# Patient Record
Sex: MALE | Race: WHITE | Employment: UNEMPLOYED | URBAN - METROPOLITAN AREA
[De-identification: names, ages, dates, MRNs, and addresses within clinical notes are randomized per-mention and may not be internally consistent; named-entity substitution may affect disease eponyms.]

---

## 2017-02-13 ENCOUNTER — HOSPITAL ENCOUNTER (EMERGENCY)
Age: 52
Discharge: HOME | End: 2017-02-13
Attending: EMERGENCY MEDICINE
Payer: MEDICARE

## 2017-02-13 ENCOUNTER — APPOINTMENT (OUTPATIENT)
Dept: CT IMAGING | Age: 52
End: 2017-02-13
Payer: MEDICARE

## 2017-02-13 VITALS
DIASTOLIC BLOOD PRESSURE: 99 MMHG | HEART RATE: 98 BPM | SYSTOLIC BLOOD PRESSURE: 150 MMHG | OXYGEN SATURATION: 97 % | RESPIRATION RATE: 16 BRPM | TEMPERATURE: 97.5 F

## 2017-02-13 DIAGNOSIS — M54.50 CHRONIC MIDLINE LOW BACK PAIN WITHOUT SCIATICA: Primary | ICD-10-CM

## 2017-02-13 DIAGNOSIS — G89.29 CHRONIC MIDLINE LOW BACK PAIN WITHOUT SCIATICA: Primary | ICD-10-CM

## 2017-02-13 DIAGNOSIS — E11.65 TYPE 2 DIABETES MELLITUS WITH HYPERGLYCEMIA, UNSPECIFIED LONG TERM INSULIN USE STATUS: ICD-10-CM

## 2017-02-13 LAB
-: NORMAL
ABSOLUTE EOS #: 0.1 K/UL (ref 0–0.4)
ABSOLUTE LYMPH #: 1.9 K/UL (ref 1–4.8)
ABSOLUTE MONO #: 0.5 K/UL (ref 0.1–1.2)
AMORPHOUS: NORMAL
ANION GAP SERPL CALCULATED.3IONS-SCNC: 15 MMOL/L (ref 9–17)
BACTERIA: NORMAL
BASOPHILS # BLD: 1 % (ref 0–2)
BASOPHILS ABSOLUTE: 0 K/UL (ref 0–0.2)
BILIRUBIN URINE: NEGATIVE
BUN BLDV-MCNC: 12 MG/DL (ref 6–20)
BUN/CREAT BLD: ABNORMAL (ref 9–20)
CALCIUM SERPL-MCNC: 9.7 MG/DL (ref 8.6–10.4)
CASTS UA: NORMAL /LPF (ref 0–8)
CHLORIDE BLD-SCNC: 90 MMOL/L (ref 98–107)
CO2: 29 MMOL/L (ref 20–31)
COLOR: YELLOW
COMMENT UA: ABNORMAL
CREAT SERPL-MCNC: 0.85 MG/DL (ref 0.7–1.2)
CRYSTALS, UA: NORMAL /HPF
DIFFERENTIAL TYPE: NORMAL
EOSINOPHILS RELATIVE PERCENT: 1 % (ref 1–4)
EPITHELIAL CELLS UA: NORMAL /HPF (ref 0–5)
GFR AFRICAN AMERICAN: >60 ML/MIN
GFR NON-AFRICAN AMERICAN: >60 ML/MIN
GFR SERPL CREATININE-BSD FRML MDRD: ABNORMAL ML/MIN/{1.73_M2}
GFR SERPL CREATININE-BSD FRML MDRD: ABNORMAL ML/MIN/{1.73_M2}
GLUCOSE BLD-MCNC: 431 MG/DL (ref 70–99)
GLUCOSE BLD-MCNC: 446 MG/DL (ref 75–110)
GLUCOSE URINE: ABNORMAL
HCT VFR BLD CALC: 45.1 % (ref 41–53)
HEMOGLOBIN: 16.2 G/DL (ref 13.5–17.5)
KETONES, URINE: ABNORMAL
LEUKOCYTE ESTERASE, URINE: NEGATIVE
LYMPHOCYTES # BLD: 26 % (ref 24–44)
MCH RBC QN AUTO: 29.8 PG (ref 26–34)
MCHC RBC AUTO-ENTMCNC: 36 G/DL (ref 31–37)
MCV RBC AUTO: 82.6 FL (ref 80–100)
MONOCYTES # BLD: 7 % (ref 2–11)
MUCUS: NORMAL
NITRITE, URINE: NEGATIVE
OTHER OBSERVATIONS UA: NORMAL
PDW BLD-RTO: 14.6 % (ref 12.5–15.4)
PH UA: 6 (ref 5–8)
PLATELET # BLD: 222 K/UL (ref 140–450)
PLATELET ESTIMATE: NORMAL
PMV BLD AUTO: 8.4 FL (ref 6–12)
POTASSIUM SERPL-SCNC: 4.1 MMOL/L (ref 3.7–5.3)
PROTEIN UA: ABNORMAL
RBC # BLD: 5.46 M/UL (ref 4.5–5.9)
RBC # BLD: NORMAL 10*6/UL
RBC UA: NORMAL /HPF (ref 0–4)
RENAL EPITHELIAL, UA: NORMAL /HPF
SEG NEUTROPHILS: 65 % (ref 36–66)
SEGMENTED NEUTROPHILS ABSOLUTE COUNT: 4.8 K/UL (ref 1.8–7.7)
SODIUM BLD-SCNC: 134 MMOL/L (ref 135–144)
SPECIFIC GRAVITY UA: 1.04 (ref 1–1.03)
TRICHOMONAS: NORMAL
TURBIDITY: CLEAR
URINE HGB: NEGATIVE
UROBILINOGEN, URINE: NORMAL
WBC # BLD: 7.4 K/UL (ref 3.5–11)
WBC # BLD: NORMAL 10*3/UL
WBC UA: NORMAL /HPF (ref 0–5)
YEAST: NORMAL

## 2017-02-13 PROCEDURE — 72131 CT LUMBAR SPINE W/O DYE: CPT

## 2017-02-13 PROCEDURE — 85025 COMPLETE CBC W/AUTO DIFF WBC: CPT

## 2017-02-13 PROCEDURE — 81001 URINALYSIS AUTO W/SCOPE: CPT

## 2017-02-13 PROCEDURE — 82947 ASSAY GLUCOSE BLOOD QUANT: CPT

## 2017-02-13 PROCEDURE — 80048 BASIC METABOLIC PNL TOTAL CA: CPT

## 2017-02-13 PROCEDURE — 6370000000 HC RX 637 (ALT 250 FOR IP): Performed by: PHYSICIAN ASSISTANT

## 2017-02-13 PROCEDURE — 99284 EMERGENCY DEPT VISIT MOD MDM: CPT

## 2017-02-13 PROCEDURE — 72128 CT CHEST SPINE W/O DYE: CPT

## 2017-02-13 PROCEDURE — 96372 THER/PROPH/DIAG INJ SC/IM: CPT

## 2017-02-13 RX ORDER — IBUPROFEN 800 MG/1
800 TABLET ORAL EVERY 8 HOURS PRN
Qty: 21 TABLET | Refills: 0 | Status: SHIPPED | OUTPATIENT
Start: 2017-02-13 | End: 2017-09-20 | Stop reason: ALTCHOICE

## 2017-02-13 RX ORDER — HYDROCODONE BITARTRATE AND ACETAMINOPHEN 5; 325 MG/1; MG/1
2 TABLET ORAL ONCE
Status: COMPLETED | OUTPATIENT
Start: 2017-02-13 | End: 2017-02-13

## 2017-02-13 RX ADMIN — HYDROCODONE BITARTRATE AND ACETAMINOPHEN 2 TABLET: 5; 325 TABLET ORAL at 10:44

## 2017-02-13 RX ADMIN — INSULIN LISPRO 10 UNITS: 100 INJECTION, SOLUTION INTRAVENOUS; SUBCUTANEOUS at 11:44

## 2017-02-13 ASSESSMENT — ENCOUNTER SYMPTOMS
RESPIRATORY NEGATIVE: 1
GASTROINTESTINAL NEGATIVE: 1
EYES NEGATIVE: 1
ALLERGIC/IMMUNOLOGIC NEGATIVE: 1
BACK PAIN: 1

## 2017-02-13 ASSESSMENT — PAIN SCALES - GENERAL
PAINLEVEL_OUTOF10: 8
PAINLEVEL_OUTOF10: 8

## 2017-04-05 RX ORDER — PEN NEEDLE, DIABETIC 31 G X1/4"
NEEDLE, DISPOSABLE MISCELLANEOUS
Qty: 100 EACH | Refills: 11 | Status: ON HOLD | OUTPATIENT
Start: 2017-04-05 | End: 2018-01-01 | Stop reason: HOSPADM

## 2017-08-18 ENCOUNTER — OFFICE VISIT (OUTPATIENT)
Dept: FAMILY MEDICINE CLINIC | Age: 52
End: 2017-08-18
Payer: MEDICARE

## 2017-08-18 VITALS
HEART RATE: 73 BPM | OXYGEN SATURATION: 99 % | SYSTOLIC BLOOD PRESSURE: 136 MMHG | DIASTOLIC BLOOD PRESSURE: 100 MMHG | BODY MASS INDEX: 24.37 KG/M2 | WEIGHT: 165 LBS | TEMPERATURE: 97 F

## 2017-08-18 DIAGNOSIS — B35.1 ONYCHOMYCOSIS: ICD-10-CM

## 2017-08-18 DIAGNOSIS — E11.42 DIABETIC POLYNEUROPATHY ASSOCIATED WITH TYPE 2 DIABETES MELLITUS (HCC): ICD-10-CM

## 2017-08-18 LAB
GLUCOSE BLD-MCNC: 514 MG/DL
HBA1C MFR BLD: 14 %

## 2017-08-18 PROCEDURE — 82962 GLUCOSE BLOOD TEST: CPT | Performed by: INTERNAL MEDICINE

## 2017-08-18 PROCEDURE — 83036 HEMOGLOBIN GLYCOSYLATED A1C: CPT | Performed by: INTERNAL MEDICINE

## 2017-08-18 PROCEDURE — 99214 OFFICE O/P EST MOD 30 MIN: CPT | Performed by: INTERNAL MEDICINE

## 2017-08-18 RX ORDER — GABAPENTIN 100 MG/1
100 CAPSULE ORAL 3 TIMES DAILY
Qty: 90 CAPSULE | Refills: 1 | Status: ON HOLD | OUTPATIENT
Start: 2017-08-18 | End: 2018-01-01 | Stop reason: HOSPADM

## 2017-09-20 ENCOUNTER — OFFICE VISIT (OUTPATIENT)
Dept: FAMILY MEDICINE CLINIC | Age: 52
End: 2017-09-20
Payer: MEDICARE

## 2017-09-20 ENCOUNTER — HOSPITAL ENCOUNTER (OUTPATIENT)
Age: 52
Setting detail: SPECIMEN
Discharge: HOME | End: 2017-09-20
Payer: MEDICARE

## 2017-09-20 VITALS
OXYGEN SATURATION: 97 % | BODY MASS INDEX: 24.29 KG/M2 | TEMPERATURE: 97 F | WEIGHT: 164 LBS | HEART RATE: 86 BPM | HEIGHT: 69 IN | DIASTOLIC BLOOD PRESSURE: 86 MMHG | SYSTOLIC BLOOD PRESSURE: 138 MMHG

## 2017-09-20 DIAGNOSIS — K21.9 GASTROESOPHAGEAL REFLUX DISEASE WITHOUT ESOPHAGITIS: ICD-10-CM

## 2017-09-20 DIAGNOSIS — Z11.4 SCREENING FOR HIV WITHOUT PRESENCE OF RISK FACTORS: ICD-10-CM

## 2017-09-20 DIAGNOSIS — Z79.4 TYPE 2 DIABETES MELLITUS WITH DIABETIC POLYNEUROPATHY, WITH LONG-TERM CURRENT USE OF INSULIN (HCC): Primary | ICD-10-CM

## 2017-09-20 DIAGNOSIS — I10 ESSENTIAL HYPERTENSION: ICD-10-CM

## 2017-09-20 DIAGNOSIS — E11.42 TYPE 2 DIABETES MELLITUS WITH DIABETIC POLYNEUROPATHY, WITH LONG-TERM CURRENT USE OF INSULIN (HCC): Primary | ICD-10-CM

## 2017-09-20 DIAGNOSIS — F12.90 MARIJUANA USE, CONTINUOUS: ICD-10-CM

## 2017-09-20 DIAGNOSIS — L40.9 PSORIASIS: ICD-10-CM

## 2017-09-20 DIAGNOSIS — E78.1 HYPERTRIGLYCERIDEMIA: ICD-10-CM

## 2017-09-20 DIAGNOSIS — Z11.59 NEED FOR HEPATITIS C SCREENING TEST: ICD-10-CM

## 2017-09-20 DIAGNOSIS — E03.9 ACQUIRED HYPOTHYROIDISM: ICD-10-CM

## 2017-09-20 DIAGNOSIS — Z00.00 HEALTH CARE MAINTENANCE: ICD-10-CM

## 2017-09-20 DIAGNOSIS — E11.42 TYPE 2 DIABETES MELLITUS WITH DIABETIC POLYNEUROPATHY, WITH LONG-TERM CURRENT USE OF INSULIN (HCC): ICD-10-CM

## 2017-09-20 DIAGNOSIS — Z79.4 TYPE 2 DIABETES MELLITUS WITH DIABETIC POLYNEUROPATHY, WITH LONG-TERM CURRENT USE OF INSULIN (HCC): ICD-10-CM

## 2017-09-20 LAB
ALBUMIN SERPL-MCNC: 4.3 G/DL (ref 3.5–5.2)
ALBUMIN/GLOBULIN RATIO: 1.4 (ref 1–2.5)
ALP BLD-CCNC: 60 U/L (ref 40–129)
ALT SERPL-CCNC: 24 U/L (ref 5–41)
ANION GAP SERPL CALCULATED.3IONS-SCNC: 14 MMOL/L (ref 9–17)
AST SERPL-CCNC: 19 U/L
BILIRUB SERPL-MCNC: 0.67 MG/DL (ref 0.3–1.2)
BUN BLDV-MCNC: 9 MG/DL (ref 6–20)
BUN/CREAT BLD: ABNORMAL (ref 9–20)
CALCIUM SERPL-MCNC: 9.4 MG/DL (ref 8.6–10.4)
CHLORIDE BLD-SCNC: 92 MMOL/L (ref 98–107)
CHOLESTEROL, FASTING: 163 MG/DL
CHOLESTEROL/HDL RATIO: 4.1
CO2: 28 MMOL/L (ref 20–31)
CREAT SERPL-MCNC: 0.72 MG/DL (ref 0.7–1.2)
CREATININE URINE: 83.8 MG/DL (ref 39–259)
GFR AFRICAN AMERICAN: >60 ML/MIN
GFR NON-AFRICAN AMERICAN: >60 ML/MIN
GFR SERPL CREATININE-BSD FRML MDRD: ABNORMAL ML/MIN/{1.73_M2}
GFR SERPL CREATININE-BSD FRML MDRD: ABNORMAL ML/MIN/{1.73_M2}
GLUCOSE BLD-MCNC: 400 MG/DL (ref 70–99)
GLUCOSE BLD-MCNC: 424 MG/DL
HBA1C MFR BLD: 14 %
HCT VFR BLD CALC: 42.8 % (ref 41–53)
HDLC SERPL-MCNC: 40 MG/DL
HEMOGLOBIN: 14.7 G/DL (ref 13.5–17.5)
HEPATITIS C ANTIBODY: NONREACTIVE
HIV AG/AB: NONREACTIVE
LDL CHOLESTEROL: 94 MG/DL (ref 0–130)
MCH RBC QN AUTO: 30.5 PG (ref 26–34)
MCHC RBC AUTO-ENTMCNC: 34.3 G/DL (ref 31–37)
MCV RBC AUTO: 89 FL (ref 80–100)
MICROALBUMIN/CREAT 24H UR: 172 MG/L
MICROALBUMIN/CREAT UR-RTO: 205 MCG/MG CREAT
PDW BLD-RTO: 14.4 % (ref 12.5–15.4)
PLATELET # BLD: 190 K/UL (ref 140–450)
PMV BLD AUTO: 9.1 FL (ref 6–12)
POTASSIUM SERPL-SCNC: 4.1 MMOL/L (ref 3.7–5.3)
RBC # BLD: 4.81 M/UL (ref 4.5–5.9)
SODIUM BLD-SCNC: 134 MMOL/L (ref 135–144)
THYROXINE, FREE: 0.62 NG/DL (ref 0.93–1.7)
TOTAL PROTEIN: 7.3 G/DL (ref 6.4–8.3)
TRIGLYCERIDE, FASTING: 147 MG/DL
TSH SERPL DL<=0.05 MIU/L-ACNC: 58.4 MIU/L (ref 0.3–5)
VLDLC SERPL CALC-MCNC: ABNORMAL MG/DL (ref 1–30)
WBC # BLD: 5.9 K/UL (ref 3.5–11)

## 2017-09-20 PROCEDURE — 36415 COLL VENOUS BLD VENIPUNCTURE: CPT | Performed by: INTERNAL MEDICINE

## 2017-09-20 PROCEDURE — 82962 GLUCOSE BLOOD TEST: CPT | Performed by: INTERNAL MEDICINE

## 2017-09-20 PROCEDURE — 99214 OFFICE O/P EST MOD 30 MIN: CPT | Performed by: INTERNAL MEDICINE

## 2017-09-20 PROCEDURE — 83036 HEMOGLOBIN GLYCOSYLATED A1C: CPT | Performed by: INTERNAL MEDICINE

## 2017-09-20 RX ORDER — ATORVASTATIN CALCIUM 40 MG/1
40 TABLET, FILM COATED ORAL DAILY
Qty: 30 TABLET | Refills: 5 | Status: ON HOLD | OUTPATIENT
Start: 2017-09-20 | End: 2018-01-01 | Stop reason: HOSPADM

## 2017-09-20 RX ORDER — LEVOTHYROXINE SODIUM 137 UG/1
137 TABLET ORAL DAILY
Qty: 30 TABLET | Refills: 5 | Status: ON HOLD | OUTPATIENT
Start: 2017-09-20 | End: 2018-01-01 | Stop reason: HOSPADM

## 2017-09-20 RX ORDER — HYDROCHLOROTHIAZIDE 25 MG/1
TABLET ORAL
Qty: 30 TABLET | Refills: 5 | Status: ON HOLD | OUTPATIENT
Start: 2017-09-20 | End: 2018-01-01 | Stop reason: HOSPADM

## 2017-09-20 RX ORDER — PIOGLITAZONEHYDROCHLORIDE 45 MG/1
45 TABLET ORAL DAILY
Qty: 30 TABLET | Refills: 3 | Status: ON HOLD | OUTPATIENT
Start: 2017-09-20 | End: 2018-01-01 | Stop reason: HOSPADM

## 2017-10-17 ENCOUNTER — TELEPHONE (OUTPATIENT)
Dept: FAMILY MEDICINE CLINIC | Age: 52
End: 2017-10-17

## 2018-01-01 ENCOUNTER — APPOINTMENT (OUTPATIENT)
Dept: CT IMAGING | Age: 53
DRG: 281 | End: 2018-01-01
Payer: MEDICARE

## 2018-01-01 ENCOUNTER — APPOINTMENT (OUTPATIENT)
Dept: NUCLEAR MEDICINE | Age: 53
DRG: 281 | End: 2018-01-01
Payer: MEDICARE

## 2018-01-01 ENCOUNTER — HOSPITAL ENCOUNTER (INPATIENT)
Age: 53
LOS: 3 days | Discharge: HOME | DRG: 281 | End: 2018-12-01
Attending: EMERGENCY MEDICINE | Admitting: INTERNAL MEDICINE
Payer: MEDICARE

## 2018-01-01 ENCOUNTER — HOSPITAL ENCOUNTER (EMERGENCY)
Age: 53
Discharge: HOME | End: 2018-12-21
Attending: EMERGENCY MEDICINE
Payer: MEDICARE

## 2018-01-01 ENCOUNTER — APPOINTMENT (OUTPATIENT)
Dept: GENERAL RADIOLOGY | Age: 53
DRG: 281 | End: 2018-01-01
Payer: MEDICARE

## 2018-01-01 ENCOUNTER — TELEPHONE (OUTPATIENT)
Dept: FAMILY MEDICINE CLINIC | Age: 53
End: 2018-01-01

## 2018-01-01 ENCOUNTER — HOSPITAL ENCOUNTER (INPATIENT)
Age: 53
LOS: 4 days | Discharge: NURSING, REHAB OR LONG-TERM CARE | DRG: 637 | End: 2018-12-20
Attending: EMERGENCY MEDICINE | Admitting: INTERNAL MEDICINE
Payer: MEDICARE

## 2018-01-01 ENCOUNTER — APPOINTMENT (OUTPATIENT)
Dept: ULTRASOUND IMAGING | Age: 53
DRG: 637 | End: 2018-01-01
Payer: MEDICARE

## 2018-01-01 VITALS
OXYGEN SATURATION: 97 % | HEART RATE: 71 BPM | WEIGHT: 186.9 LBS | BODY MASS INDEX: 28.33 KG/M2 | RESPIRATION RATE: 11 BRPM | HEIGHT: 68 IN | TEMPERATURE: 97.5 F | DIASTOLIC BLOOD PRESSURE: 97 MMHG | SYSTOLIC BLOOD PRESSURE: 152 MMHG

## 2018-01-01 VITALS
SYSTOLIC BLOOD PRESSURE: 156 MMHG | BODY MASS INDEX: 27.28 KG/M2 | TEMPERATURE: 98.1 F | RESPIRATION RATE: 15 BRPM | OXYGEN SATURATION: 96 % | DIASTOLIC BLOOD PRESSURE: 96 MMHG | HEIGHT: 68 IN | HEART RATE: 89 BPM | WEIGHT: 180 LBS

## 2018-01-01 VITALS
DIASTOLIC BLOOD PRESSURE: 92 MMHG | OXYGEN SATURATION: 97 % | WEIGHT: 193.34 LBS | SYSTOLIC BLOOD PRESSURE: 168 MMHG | BODY MASS INDEX: 29.3 KG/M2 | TEMPERATURE: 97.6 F | HEIGHT: 68 IN | HEART RATE: 94 BPM | RESPIRATION RATE: 18 BRPM

## 2018-01-01 DIAGNOSIS — R79.89 ELEVATED TSH: Primary | ICD-10-CM

## 2018-01-01 DIAGNOSIS — I87.2 VENOUS STASIS DERMATITIS OF BOTH LOWER EXTREMITIES: ICD-10-CM

## 2018-01-01 DIAGNOSIS — E11.65 UNCONTROLLED TYPE 2 DIABETES MELLITUS WITH HYPERGLYCEMIA (HCC): ICD-10-CM

## 2018-01-01 DIAGNOSIS — L97.409 DIABETIC ULCER OF MIDFOOT ASSOCIATED WITH TYPE 2 DIABETES MELLITUS, UNSPECIFIED LATERALITY, UNSPECIFIED ULCER STAGE (HCC): Primary | ICD-10-CM

## 2018-01-01 DIAGNOSIS — E03.8 HYPOTHYROIDISM DUE TO HASHIMOTO'S THYROIDITIS: ICD-10-CM

## 2018-01-01 DIAGNOSIS — E11.621 DIABETIC ULCER OF MIDFOOT ASSOCIATED WITH TYPE 2 DIABETES MELLITUS, UNSPECIFIED LATERALITY, UNSPECIFIED ULCER STAGE (HCC): Primary | ICD-10-CM

## 2018-01-01 DIAGNOSIS — R73.9 HYPERGLYCEMIA: ICD-10-CM

## 2018-01-01 DIAGNOSIS — S09.90XA INJURY OF HEAD, INITIAL ENCOUNTER: Primary | ICD-10-CM

## 2018-01-01 DIAGNOSIS — E87.6 HYPOKALEMIA: ICD-10-CM

## 2018-01-01 DIAGNOSIS — E06.3 HYPOTHYROIDISM DUE TO HASHIMOTO'S THYROIDITIS: ICD-10-CM

## 2018-01-01 DIAGNOSIS — E03.9 HYPOTHYROIDISM, UNSPECIFIED TYPE: ICD-10-CM

## 2018-01-01 DIAGNOSIS — I10 ESSENTIAL HYPERTENSION: ICD-10-CM

## 2018-01-01 LAB
-: ABNORMAL
-: ABNORMAL
-: NORMAL
ABSOLUTE EOS #: 0.03 K/UL (ref 0–0.44)
ABSOLUTE EOS #: 0.06 K/UL (ref 0–0.44)
ABSOLUTE EOS #: 0.07 K/UL (ref 0–0.44)
ABSOLUTE EOS #: <0.03 K/UL (ref 0–0.44)
ABSOLUTE IMMATURE GRANULOCYTE: 0.03 K/UL (ref 0–0.3)
ABSOLUTE IMMATURE GRANULOCYTE: 0.03 K/UL (ref 0–0.3)
ABSOLUTE IMMATURE GRANULOCYTE: 0.04 K/UL (ref 0–0.3)
ABSOLUTE IMMATURE GRANULOCYTE: 0.05 K/UL (ref 0–0.3)
ABSOLUTE IMMATURE GRANULOCYTE: 0.06 K/UL (ref 0–0.3)
ABSOLUTE IMMATURE GRANULOCYTE: 0.06 K/UL (ref 0–0.3)
ABSOLUTE LYMPH #: 0.91 K/UL (ref 1.1–3.7)
ABSOLUTE LYMPH #: 1.2 K/UL (ref 1.1–3.7)
ABSOLUTE LYMPH #: 1.28 K/UL (ref 1.1–3.7)
ABSOLUTE LYMPH #: 1.66 K/UL (ref 1.1–3.7)
ABSOLUTE LYMPH #: 1.75 K/UL (ref 1.1–3.7)
ABSOLUTE LYMPH #: 1.88 K/UL (ref 1.1–3.7)
ABSOLUTE MONO #: 0.51 K/UL (ref 0.1–1.2)
ABSOLUTE MONO #: 0.51 K/UL (ref 0.1–1.2)
ABSOLUTE MONO #: 0.53 K/UL (ref 0.1–1.2)
ABSOLUTE MONO #: 0.55 K/UL (ref 0.1–1.2)
ABSOLUTE MONO #: 0.63 K/UL (ref 0.1–1.2)
ABSOLUTE MONO #: 0.71 K/UL (ref 0.1–1.2)
ABSOLUTE RETIC #: 0.09 M/UL (ref 0.03–0.08)
ALBUMIN SERPL-MCNC: 3.2 G/DL (ref 3.5–5.2)
ALBUMIN SERPL-MCNC: 3.4 G/DL (ref 3.5–5.2)
ALBUMIN SERPL-MCNC: 3.8 G/DL (ref 3.5–5.2)
ALBUMIN/GLOBULIN RATIO: 1.1 (ref 1–2.5)
ALBUMIN/GLOBULIN RATIO: 1.4 (ref 1–2.5)
ALBUMIN/GLOBULIN RATIO: 1.5 (ref 1–2.5)
ALLEN TEST: ABNORMAL
ALLEN TEST: ABNORMAL
ALP BLD-CCNC: 175 U/L (ref 40–129)
ALP BLD-CCNC: 208 U/L (ref 40–129)
ALP BLD-CCNC: 367 U/L (ref 40–129)
ALT SERPL-CCNC: 51 U/L (ref 5–41)
ALT SERPL-CCNC: 77 U/L (ref 5–41)
ALT SERPL-CCNC: 90 U/L (ref 5–41)
AMORPHOUS: ABNORMAL
AMORPHOUS: ABNORMAL
AMORPHOUS: NORMAL
ANION GAP SERPL CALCULATED.3IONS-SCNC: 10 MMOL/L (ref 9–17)
ANION GAP SERPL CALCULATED.3IONS-SCNC: 11 MMOL/L (ref 9–17)
ANION GAP SERPL CALCULATED.3IONS-SCNC: 11 MMOL/L (ref 9–17)
ANION GAP SERPL CALCULATED.3IONS-SCNC: 12 MMOL/L (ref 9–17)
ANION GAP SERPL CALCULATED.3IONS-SCNC: 13 MMOL/L (ref 9–17)
ANION GAP SERPL CALCULATED.3IONS-SCNC: 15 MMOL/L (ref 9–17)
ANION GAP SERPL CALCULATED.3IONS-SCNC: 9 MMOL/L (ref 9–17)
ANION GAP SERPL CALCULATED.3IONS-SCNC: 9 MMOL/L (ref 9–17)
ANION GAP: 10 MMOL/L (ref 7–16)
ANION GAP: 8 MMOL/L (ref 7–16)
AST SERPL-CCNC: 19 U/L
AST SERPL-CCNC: 34 U/L
AST SERPL-CCNC: 38 U/L
BACTERIA: ABNORMAL
BACTERIA: ABNORMAL
BACTERIA: NORMAL
BASOPHILS # BLD: 0 % (ref 0–2)
BASOPHILS # BLD: 0 % (ref 0–2)
BASOPHILS # BLD: 1 % (ref 0–2)
BASOPHILS ABSOLUTE: 0.03 K/UL (ref 0–0.2)
BASOPHILS ABSOLUTE: 0.04 K/UL (ref 0–0.2)
BASOPHILS ABSOLUTE: 0.04 K/UL (ref 0–0.2)
BASOPHILS ABSOLUTE: 0.05 K/UL (ref 0–0.2)
BASOPHILS ABSOLUTE: 0.06 K/UL (ref 0–0.2)
BASOPHILS ABSOLUTE: <0.03 K/UL (ref 0–0.2)
BETA-HYDROXYBUTYRATE: 0.57 MMOL/L (ref 0.02–0.27)
BILIRUB SERPL-MCNC: 0.53 MG/DL (ref 0.3–1.2)
BILIRUB SERPL-MCNC: 0.56 MG/DL (ref 0.3–1.2)
BILIRUB SERPL-MCNC: 0.97 MG/DL (ref 0.3–1.2)
BILIRUBIN DIRECT: 0.17 MG/DL
BILIRUBIN DIRECT: 0.18 MG/DL
BILIRUBIN URINE: NEGATIVE
BILIRUBIN, INDIRECT: 0.35 MG/DL (ref 0–1)
BILIRUBIN, INDIRECT: 0.39 MG/DL (ref 0–1)
BUN BLDV-MCNC: 10 MG/DL (ref 6–20)
BUN BLDV-MCNC: 11 MG/DL (ref 6–20)
BUN BLDV-MCNC: 12 MG/DL (ref 6–20)
BUN BLDV-MCNC: 12 MG/DL (ref 6–20)
BUN BLDV-MCNC: 13 MG/DL (ref 6–20)
BUN BLDV-MCNC: 7 MG/DL (ref 6–20)
BUN BLDV-MCNC: 8 MG/DL (ref 6–20)
BUN BLDV-MCNC: 9 MG/DL (ref 6–20)
BUN/CREAT BLD: ABNORMAL (ref 9–20)
C DIFF AG + TOXIN: NORMAL
CALCIUM SERPL-MCNC: 8 MG/DL (ref 8.6–10.4)
CALCIUM SERPL-MCNC: 8.1 MG/DL (ref 8.6–10.4)
CALCIUM SERPL-MCNC: 8.1 MG/DL (ref 8.6–10.4)
CALCIUM SERPL-MCNC: 8.3 MG/DL (ref 8.6–10.4)
CALCIUM SERPL-MCNC: 8.3 MG/DL (ref 8.6–10.4)
CALCIUM SERPL-MCNC: 8.4 MG/DL (ref 8.6–10.4)
CALCIUM SERPL-MCNC: 8.4 MG/DL (ref 8.6–10.4)
CALCIUM SERPL-MCNC: 8.5 MG/DL (ref 8.6–10.4)
CALCIUM SERPL-MCNC: 8.5 MG/DL (ref 8.6–10.4)
CALCIUM SERPL-MCNC: 8.6 MG/DL (ref 8.6–10.4)
CALCIUM SERPL-MCNC: 8.7 MG/DL (ref 8.6–10.4)
CALCIUM SERPL-MCNC: 9.2 MG/DL (ref 8.6–10.4)
CAMPYLOBACTER PCR: NORMAL
CASTS UA: ABNORMAL /LPF (ref 0–8)
CASTS UA: ABNORMAL /LPF (ref 0–8)
CASTS UA: NORMAL /LPF (ref 0–8)
CHLORIDE BLD-SCNC: 100 MMOL/L (ref 98–107)
CHLORIDE BLD-SCNC: 100 MMOL/L (ref 98–107)
CHLORIDE BLD-SCNC: 102 MMOL/L (ref 98–107)
CHLORIDE BLD-SCNC: 90 MMOL/L (ref 98–107)
CHLORIDE BLD-SCNC: 93 MMOL/L (ref 98–107)
CHLORIDE BLD-SCNC: 96 MMOL/L (ref 98–107)
CHLORIDE BLD-SCNC: 96 MMOL/L (ref 98–107)
CHLORIDE BLD-SCNC: 97 MMOL/L (ref 98–107)
CHLORIDE BLD-SCNC: 98 MMOL/L (ref 98–107)
CHLORIDE BLD-SCNC: 99 MMOL/L (ref 98–107)
CHP ED QC CHECK: NORMAL
CO2: 26 MMOL/L (ref 20–31)
CO2: 27 MMOL/L (ref 20–31)
CO2: 28 MMOL/L (ref 20–31)
CO2: 29 MMOL/L (ref 20–31)
COLOR: ABNORMAL
COLOR: YELLOW
COLOR: YELLOW
COMMENT UA: ABNORMAL
CREAT SERPL-MCNC: 0.65 MG/DL (ref 0.7–1.2)
CREAT SERPL-MCNC: 0.65 MG/DL (ref 0.7–1.2)
CREAT SERPL-MCNC: 0.67 MG/DL (ref 0.7–1.2)
CREAT SERPL-MCNC: 0.68 MG/DL (ref 0.7–1.2)
CREAT SERPL-MCNC: 0.69 MG/DL (ref 0.7–1.2)
CREAT SERPL-MCNC: 0.72 MG/DL (ref 0.7–1.2)
CREAT SERPL-MCNC: 0.73 MG/DL (ref 0.7–1.2)
CREAT SERPL-MCNC: 0.74 MG/DL (ref 0.7–1.2)
CREAT SERPL-MCNC: 0.75 MG/DL (ref 0.7–1.2)
CREAT SERPL-MCNC: 0.76 MG/DL (ref 0.7–1.2)
CREAT SERPL-MCNC: 0.76 MG/DL (ref 0.7–1.2)
CREAT SERPL-MCNC: 0.77 MG/DL (ref 0.7–1.2)
CREAT SERPL-MCNC: 0.78 MG/DL (ref 0.7–1.2)
CREAT SERPL-MCNC: 0.82 MG/DL (ref 0.7–1.2)
CREATININE URINE: 56.6 MG/DL (ref 39–259)
CRYSTALS, UA: ABNORMAL /HPF
CRYSTALS, UA: ABNORMAL /HPF
CRYSTALS, UA: NORMAL /HPF
CULTURE: ABNORMAL
CULTURE: ABNORMAL
CULTURE: NORMAL
DIFFERENTIAL TYPE: ABNORMAL
EKG ATRIAL RATE: 101 BPM
EKG ATRIAL RATE: 68 BPM
EKG ATRIAL RATE: 71 BPM
EKG ATRIAL RATE: 78 BPM
EKG ATRIAL RATE: 80 BPM
EKG ATRIAL RATE: 83 BPM
EKG ATRIAL RATE: 88 BPM
EKG ATRIAL RATE: 89 BPM
EKG ATRIAL RATE: 94 BPM
EKG P AXIS: 47 DEGREES
EKG P AXIS: 47 DEGREES
EKG P AXIS: 48 DEGREES
EKG P AXIS: 53 DEGREES
EKG P AXIS: 55 DEGREES
EKG P AXIS: 55 DEGREES
EKG P AXIS: 56 DEGREES
EKG P AXIS: 65 DEGREES
EKG P AXIS: 73 DEGREES
EKG P-R INTERVAL: 156 MS
EKG P-R INTERVAL: 156 MS
EKG P-R INTERVAL: 168 MS
EKG P-R INTERVAL: 170 MS
EKG P-R INTERVAL: 174 MS
EKG P-R INTERVAL: 182 MS
EKG P-R INTERVAL: 198 MS
EKG Q-T INTERVAL: 346 MS
EKG Q-T INTERVAL: 370 MS
EKG Q-T INTERVAL: 374 MS
EKG Q-T INTERVAL: 376 MS
EKG Q-T INTERVAL: 378 MS
EKG Q-T INTERVAL: 382 MS
EKG Q-T INTERVAL: 402 MS
EKG Q-T INTERVAL: 408 MS
EKG Q-T INTERVAL: 414 MS
EKG QRS DURATION: 68 MS
EKG QRS DURATION: 72 MS
EKG QRS DURATION: 72 MS
EKG QRS DURATION: 76 MS
EKG QRS DURATION: 80 MS
EKG QRS DURATION: 82 MS
EKG QRS DURATION: 82 MS
EKG QRS DURATION: 84 MS
EKG QRS DURATION: 86 MS
EKG QTC CALCULATION (BAZETT): 402 MS
EKG QTC CALCULATION (BAZETT): 433 MS
EKG QTC CALCULATION (BAZETT): 440 MS
EKG QTC CALCULATION (BAZETT): 448 MS
EKG QTC CALCULATION (BAZETT): 454 MS
EKG QTC CALCULATION (BAZETT): 459 MS
EKG QTC CALCULATION (BAZETT): 467 MS
EKG QTC CALCULATION (BAZETT): 471 MS
EKG QTC CALCULATION (BAZETT): 472 MS
EKG R AXIS: 16 DEGREES
EKG R AXIS: 27 DEGREES
EKG R AXIS: 38 DEGREES
EKG R AXIS: 39 DEGREES
EKG R AXIS: 44 DEGREES
EKG R AXIS: 51 DEGREES
EKG R AXIS: 61 DEGREES
EKG R AXIS: 63 DEGREES
EKG R AXIS: 79 DEGREES
EKG T AXIS: 28 DEGREES
EKG T AXIS: 31 DEGREES
EKG T AXIS: 37 DEGREES
EKG T AXIS: 38 DEGREES
EKG T AXIS: 40 DEGREES
EKG T AXIS: 40 DEGREES
EKG T AXIS: 44 DEGREES
EKG T AXIS: 55 DEGREES
EKG T AXIS: 61 DEGREES
EKG VENTRICULAR RATE: 101 BPM
EKG VENTRICULAR RATE: 68 BPM
EKG VENTRICULAR RATE: 71 BPM
EKG VENTRICULAR RATE: 78 BPM
EKG VENTRICULAR RATE: 80 BPM
EKG VENTRICULAR RATE: 83 BPM
EKG VENTRICULAR RATE: 88 BPM
EKG VENTRICULAR RATE: 89 BPM
EKG VENTRICULAR RATE: 94 BPM
EOSINOPHILS RELATIVE PERCENT: 0 % (ref 1–4)
EOSINOPHILS RELATIVE PERCENT: 0 % (ref 1–4)
EOSINOPHILS RELATIVE PERCENT: 1 % (ref 1–4)
EPITHELIAL CELLS UA: ABNORMAL /HPF (ref 0–5)
EPITHELIAL CELLS UA: ABNORMAL /HPF (ref 0–5)
EPITHELIAL CELLS UA: NORMAL /HPF (ref 0–5)
ESTIMATED AVERAGE GLUCOSE: 358 MG/DL
ESTIMATED AVERAGE GLUCOSE: NORMAL MG/DL
FERRITIN: 399 UG/L (ref 30–400)
FIO2: ABNORMAL
FIO2: ABNORMAL
FOLATE: 14.1 NG/ML
GFR AFRICAN AMERICAN: >60 ML/MIN
GFR NON-AFRICAN AMERICAN: >60 ML/MIN
GFR SERPL CREATININE-BSD FRML MDRD: >60 ML/MIN
GFR SERPL CREATININE-BSD FRML MDRD: >60 ML/MIN
GFR SERPL CREATININE-BSD FRML MDRD: ABNORMAL ML/MIN/{1.73_M2}
GFR SERPL CREATININE-BSD FRML MDRD: NORMAL ML/MIN/{1.73_M2}
GFR SERPL CREATININE-BSD FRML MDRD: NORMAL ML/MIN/{1.73_M2}
GGT: 201 U/L (ref 8–61)
GLOBULIN: ABNORMAL G/DL (ref 1.5–3.8)
GLOBULIN: ABNORMAL G/DL (ref 1.5–3.8)
GLUCOSE BLD-MCNC: 106 MG/DL (ref 75–110)
GLUCOSE BLD-MCNC: 107 MG/DL (ref 75–110)
GLUCOSE BLD-MCNC: 115 MG/DL (ref 70–99)
GLUCOSE BLD-MCNC: 124 MG/DL (ref 75–110)
GLUCOSE BLD-MCNC: 127 MG/DL (ref 75–110)
GLUCOSE BLD-MCNC: 133 MG/DL (ref 75–110)
GLUCOSE BLD-MCNC: 143 MG/DL (ref 75–110)
GLUCOSE BLD-MCNC: 144 MG/DL (ref 75–110)
GLUCOSE BLD-MCNC: 145 MG/DL (ref 75–110)
GLUCOSE BLD-MCNC: 146 MG/DL (ref 75–110)
GLUCOSE BLD-MCNC: 147 MG/DL (ref 75–110)
GLUCOSE BLD-MCNC: 151 MG/DL (ref 75–110)
GLUCOSE BLD-MCNC: 153 MG/DL (ref 75–110)
GLUCOSE BLD-MCNC: 154 MG/DL (ref 75–110)
GLUCOSE BLD-MCNC: 155 MG/DL (ref 70–99)
GLUCOSE BLD-MCNC: 155 MG/DL (ref 75–110)
GLUCOSE BLD-MCNC: 171 MG/DL (ref 75–110)
GLUCOSE BLD-MCNC: 177 MG/DL (ref 75–110)
GLUCOSE BLD-MCNC: 181 MG/DL (ref 75–110)
GLUCOSE BLD-MCNC: 186 MG/DL (ref 70–99)
GLUCOSE BLD-MCNC: 189 MG/DL (ref 70–99)
GLUCOSE BLD-MCNC: 191 MG/DL (ref 75–110)
GLUCOSE BLD-MCNC: 191 MG/DL (ref 75–110)
GLUCOSE BLD-MCNC: 193 MG/DL (ref 75–110)
GLUCOSE BLD-MCNC: 195 MG/DL (ref 75–110)
GLUCOSE BLD-MCNC: 215 MG/DL (ref 75–110)
GLUCOSE BLD-MCNC: 216 MG/DL (ref 75–110)
GLUCOSE BLD-MCNC: 224 MG/DL (ref 75–110)
GLUCOSE BLD-MCNC: 224 MG/DL (ref 75–110)
GLUCOSE BLD-MCNC: 226 MG/DL (ref 70–99)
GLUCOSE BLD-MCNC: 228 MG/DL (ref 75–110)
GLUCOSE BLD-MCNC: 230 MG/DL (ref 75–110)
GLUCOSE BLD-MCNC: 231 MG/DL (ref 75–110)
GLUCOSE BLD-MCNC: 241 MG/DL (ref 75–110)
GLUCOSE BLD-MCNC: 252 MG/DL (ref 75–110)
GLUCOSE BLD-MCNC: 260 MG/DL (ref 75–110)
GLUCOSE BLD-MCNC: 265 MG/DL (ref 70–99)
GLUCOSE BLD-MCNC: 266 MG/DL (ref 75–110)
GLUCOSE BLD-MCNC: 270 MG/DL (ref 75–110)
GLUCOSE BLD-MCNC: 277 MG/DL (ref 75–110)
GLUCOSE BLD-MCNC: 297 MG/DL (ref 75–110)
GLUCOSE BLD-MCNC: 314 MG/DL (ref 70–99)
GLUCOSE BLD-MCNC: 324 MG/DL (ref 75–110)
GLUCOSE BLD-MCNC: 343 MG/DL (ref 70–99)
GLUCOSE BLD-MCNC: 350 MG/DL (ref 70–99)
GLUCOSE BLD-MCNC: 405 MG/DL
GLUCOSE BLD-MCNC: 405 MG/DL (ref 75–110)
GLUCOSE BLD-MCNC: 408 MG/DL (ref 75–110)
GLUCOSE BLD-MCNC: 409 MG/DL (ref 75–110)
GLUCOSE BLD-MCNC: 511 MG/DL (ref 74–100)
GLUCOSE BLD-MCNC: 587 MG/DL (ref 74–100)
GLUCOSE BLD-MCNC: 623 MG/DL (ref 70–99)
GLUCOSE BLD-MCNC: 65 MG/DL (ref 70–99)
GLUCOSE BLD-MCNC: 73 MG/DL (ref 75–110)
GLUCOSE BLD-MCNC: 74 MG/DL (ref 75–110)
GLUCOSE BLD-MCNC: 86 MG/DL (ref 70–99)
GLUCOSE BLD-MCNC: 94 MG/DL (ref 70–99)
GLUCOSE BLD-MCNC: 94 MG/DL (ref 70–99)
GLUCOSE URINE: ABNORMAL
HBA1C MFR BLD: 14.1 %
HBA1C MFR BLD: NORMAL % (ref 4–6)
HCO3 VENOUS: 31.9 MMOL/L (ref 22–29)
HCO3 VENOUS: 33.4 MMOL/L (ref 22–29)
HCT VFR BLD CALC: 32.1 % (ref 40.7–50.3)
HCT VFR BLD CALC: 33.2 % (ref 40.7–50.3)
HCT VFR BLD CALC: 34.9 % (ref 40.7–50.3)
HCT VFR BLD CALC: 34.9 % (ref 40.7–50.3)
HCT VFR BLD CALC: 35.4 % (ref 40.7–50.3)
HCT VFR BLD CALC: 36.7 % (ref 40.7–50.3)
HCT VFR BLD CALC: 36.8 % (ref 40.7–50.3)
HCT VFR BLD CALC: 37.7 % (ref 40.7–50.3)
HCT VFR BLD CALC: 39 % (ref 40.7–50.3)
HCT VFR BLD CALC: 42.2 % (ref 40.7–50.3)
HEMOGLOBIN: 11.1 G/DL (ref 13–17)
HEMOGLOBIN: 11.2 G/DL (ref 13–17)
HEMOGLOBIN: 11.8 G/DL (ref 13–17)
HEMOGLOBIN: 11.9 G/DL (ref 13–17)
HEMOGLOBIN: 12.6 G/DL (ref 13–17)
HEMOGLOBIN: 12.7 G/DL (ref 13–17)
HEMOGLOBIN: 12.8 G/DL (ref 13–17)
HEMOGLOBIN: 13.2 G/DL (ref 13–17)
HEMOGLOBIN: 14.3 G/DL (ref 13–17)
HEMOGLOBIN: 15.1 G/DL (ref 13–17)
IMMATURE GRANULOCYTES: 0 %
IMMATURE GRANULOCYTES: 1 %
IMMATURE RETIC FRACT: 10.9 % (ref 2.7–18.3)
IRON SATURATION: 19 % (ref 20–55)
IRON: 43 UG/DL (ref 59–158)
KETONES, URINE: ABNORMAL
KETONES, URINE: ABNORMAL
KETONES, URINE: NEGATIVE
LEUKOCYTE ESTERASE, URINE: NEGATIVE
LIPASE: 37 U/L (ref 13–60)
LV EF: 48 %
LV EF: 52 %
LVEF MODALITY: NORMAL
LVEF MODALITY: NORMAL
LYMPHOCYTES # BLD: 10 % (ref 24–43)
LYMPHOCYTES # BLD: 15 % (ref 24–43)
LYMPHOCYTES # BLD: 17 % (ref 24–43)
LYMPHOCYTES # BLD: 25 % (ref 24–43)
LYMPHOCYTES # BLD: 25 % (ref 24–43)
LYMPHOCYTES # BLD: 31 % (ref 24–43)
Lab: ABNORMAL
Lab: NORMAL
MAGNESIUM: 1.6 MG/DL (ref 1.6–2.6)
MAGNESIUM: 2.2 MG/DL (ref 1.6–2.6)
MAGNESIUM: 2.3 MG/DL (ref 1.6–2.6)
MAGNESIUM: 2.4 MG/DL (ref 1.6–2.6)
MCH RBC QN AUTO: 30.5 PG (ref 25.2–33.5)
MCH RBC QN AUTO: 30.6 PG (ref 25.2–33.5)
MCH RBC QN AUTO: 30.7 PG (ref 25.2–33.5)
MCH RBC QN AUTO: 31.1 PG (ref 25.2–33.5)
MCH RBC QN AUTO: 31.1 PG (ref 25.2–33.5)
MCH RBC QN AUTO: 31.2 PG (ref 25.2–33.5)
MCH RBC QN AUTO: 31.3 PG (ref 25.2–33.5)
MCH RBC QN AUTO: 31.3 PG (ref 25.2–33.5)
MCHC RBC AUTO-ENTMCNC: 33.7 G/DL (ref 28.4–34.8)
MCHC RBC AUTO-ENTMCNC: 33.8 G/DL (ref 28.4–34.8)
MCHC RBC AUTO-ENTMCNC: 34.1 G/DL (ref 28.4–34.8)
MCHC RBC AUTO-ENTMCNC: 34.3 G/DL (ref 28.4–34.8)
MCHC RBC AUTO-ENTMCNC: 34.6 G/DL (ref 28.4–34.8)
MCHC RBC AUTO-ENTMCNC: 34.8 G/DL (ref 28.4–34.8)
MCHC RBC AUTO-ENTMCNC: 35 G/DL (ref 28.4–34.8)
MCHC RBC AUTO-ENTMCNC: 35.8 G/DL (ref 28.4–34.8)
MCHC RBC AUTO-ENTMCNC: 35.9 G/DL (ref 28.4–34.8)
MCHC RBC AUTO-ENTMCNC: 36.7 G/DL (ref 28.4–34.8)
MCV RBC AUTO: 83.7 FL (ref 82.6–102.9)
MCV RBC AUTO: 85.3 FL (ref 82.6–102.9)
MCV RBC AUTO: 86.8 FL (ref 82.6–102.9)
MCV RBC AUTO: 89.1 FL (ref 82.6–102.9)
MCV RBC AUTO: 89.3 FL (ref 82.6–102.9)
MCV RBC AUTO: 89.5 FL (ref 82.6–102.9)
MCV RBC AUTO: 90.2 FL (ref 82.6–102.9)
MCV RBC AUTO: 90.4 FL (ref 82.6–102.9)
MCV RBC AUTO: 90.7 FL (ref 82.6–102.9)
MCV RBC AUTO: 91.8 FL (ref 82.6–102.9)
MODE: ABNORMAL
MODE: ABNORMAL
MONOCYTES # BLD: 6 % (ref 3–12)
MONOCYTES # BLD: 7 % (ref 3–12)
MONOCYTES # BLD: 8 % (ref 3–12)
MONOCYTES # BLD: 9 % (ref 3–12)
MUCUS: ABNORMAL
MUCUS: ABNORMAL
MUCUS: NORMAL
NEGATIVE BASE EXCESS, VEN: ABNORMAL (ref 0–2)
NEGATIVE BASE EXCESS, VEN: ABNORMAL (ref 0–2)
NITRITE, URINE: NEGATIVE
NRBC AUTOMATED: 0 PER 100 WBC
NRBC AUTOMATED: 0.3 PER 100 WBC
O2 DEVICE/FLOW/%: ABNORMAL
O2 DEVICE/FLOW/%: ABNORMAL
O2 SAT, VEN: 51 % (ref 60–85)
O2 SAT, VEN: 90 % (ref 60–85)
OTHER OBSERVATIONS UA: ABNORMAL
OTHER OBSERVATIONS UA: ABNORMAL
OTHER OBSERVATIONS UA: NORMAL
PARTIAL THROMBOPLASTIN TIME: 35.2 SEC (ref 20.5–30.5)
PARTIAL THROMBOPLASTIN TIME: 38.5 SEC (ref 20.5–30.5)
PARTIAL THROMBOPLASTIN TIME: 40.9 SEC (ref 20.5–30.5)
PARTIAL THROMBOPLASTIN TIME: 45.5 SEC (ref 20.5–30.5)
PARTIAL THROMBOPLASTIN TIME: 52.6 SEC (ref 20.5–30.5)
PARTIAL THROMBOPLASTIN TIME: 58.1 SEC (ref 20.5–30.5)
PARTIAL THROMBOPLASTIN TIME: 59.4 SEC (ref 20.5–30.5)
PARTIAL THROMBOPLASTIN TIME: 75.8 SEC (ref 20.5–30.5)
PARTIAL THROMBOPLASTIN TIME: >120 SEC (ref 20.5–30.5)
PATIENT TEMP: ABNORMAL
PATIENT TEMP: ABNORMAL
PCO2, VEN: 41.1 MM HG (ref 41–51)
PCO2, VEN: 51.3 MM HG (ref 41–51)
PDW BLD-RTO: 12.7 % (ref 11.8–14.4)
PDW BLD-RTO: 12.8 % (ref 11.8–14.4)
PDW BLD-RTO: 12.9 % (ref 11.8–14.4)
PDW BLD-RTO: 13 % (ref 11.8–14.4)
PDW BLD-RTO: 13.1 % (ref 11.8–14.4)
PDW BLD-RTO: 13.1 % (ref 11.8–14.4)
PDW BLD-RTO: 13.2 % (ref 11.8–14.4)
PDW BLD-RTO: 13.2 % (ref 11.8–14.4)
PDW BLD-RTO: 13.4 % (ref 11.8–14.4)
PDW BLD-RTO: 13.5 % (ref 11.8–14.4)
PH UA: 6.5 (ref 5–8)
PH UA: 7.5 (ref 5–8)
PH UA: 7.5 (ref 5–8)
PH VENOUS: 7.42 (ref 7.32–7.43)
PH VENOUS: 7.5 (ref 7.32–7.43)
PHOSPHORUS: 1.9 MG/DL (ref 2.5–4.5)
PHOSPHORUS: 2 MG/DL (ref 2.5–4.5)
PHOSPHORUS: 2.4 MG/DL (ref 2.5–4.5)
PHOSPHORUS: 3.1 MG/DL (ref 2.5–4.5)
PHOSPHORUS: 3.3 MG/DL (ref 2.5–4.5)
PLATELET # BLD: 169 K/UL (ref 138–453)
PLATELET # BLD: 179 K/UL (ref 138–453)
PLATELET # BLD: 187 K/UL (ref 138–453)
PLATELET # BLD: 188 K/UL (ref 138–453)
PLATELET # BLD: 190 K/UL (ref 138–453)
PLATELET # BLD: 203 K/UL (ref 138–453)
PLATELET # BLD: 207 K/UL (ref 138–453)
PLATELET # BLD: 209 K/UL (ref 138–453)
PLATELET # BLD: 211 K/UL (ref 138–453)
PLATELET # BLD: 228 K/UL (ref 138–453)
PLATELET ESTIMATE: ABNORMAL
PMV BLD AUTO: 10 FL (ref 8.1–13.5)
PMV BLD AUTO: 10 FL (ref 8.1–13.5)
PMV BLD AUTO: 10.1 FL (ref 8.1–13.5)
PMV BLD AUTO: 10.2 FL (ref 8.1–13.5)
PMV BLD AUTO: 10.2 FL (ref 8.1–13.5)
PMV BLD AUTO: 9.6 FL (ref 8.1–13.5)
PMV BLD AUTO: 9.7 FL (ref 8.1–13.5)
PMV BLD AUTO: 9.8 FL (ref 8.1–13.5)
PO2, VEN: 27.3 MM HG (ref 30–50)
PO2, VEN: 53.3 MM HG (ref 30–50)
POC CHLORIDE: 92 MMOL/L (ref 98–107)
POC CHLORIDE: 92 MMOL/L (ref 98–107)
POC CREATININE: 0.86 MG/DL (ref 0.51–1.19)
POC CREATININE: 0.87 MG/DL (ref 0.51–1.19)
POC HEMATOCRIT: 40 % (ref 41–53)
POC HEMATOCRIT: 44 % (ref 41–53)
POC HEMOGLOBIN: 13.7 G/DL (ref 13.5–17.5)
POC HEMOGLOBIN: 14.8 G/DL (ref 13.5–17.5)
POC IONIZED CALCIUM: 1.03 MMOL/L (ref 1.15–1.33)
POC IONIZED CALCIUM: 1.08 MMOL/L (ref 1.15–1.33)
POC LACTIC ACID: 2.88 MMOL/L (ref 0.56–1.39)
POC LACTIC ACID: 3.27 MMOL/L (ref 0.56–1.39)
POC PCO2 TEMP: ABNORMAL MM HG
POC PCO2 TEMP: ABNORMAL MM HG
POC PH TEMP: ABNORMAL
POC PH TEMP: ABNORMAL
POC PO2 TEMP: ABNORMAL MM HG
POC PO2 TEMP: ABNORMAL MM HG
POC POTASSIUM: 3.1 MMOL/L (ref 3.5–4.5)
POC POTASSIUM: 3.4 MMOL/L (ref 3.5–4.5)
POC SODIUM: 132 MMOL/L (ref 138–146)
POC SODIUM: 135 MMOL/L (ref 138–146)
POSITIVE BASE EXCESS, VEN: 7 (ref 0–3)
POSITIVE BASE EXCESS, VEN: 8 (ref 0–3)
POTASSIUM SERPL-SCNC: 2.6 MMOL/L (ref 3.7–5.3)
POTASSIUM SERPL-SCNC: 2.8 MMOL/L (ref 3.7–5.3)
POTASSIUM SERPL-SCNC: 3.1 MMOL/L (ref 3.7–5.3)
POTASSIUM SERPL-SCNC: 3.2 MMOL/L (ref 3.7–5.3)
POTASSIUM SERPL-SCNC: 3.2 MMOL/L (ref 3.7–5.3)
POTASSIUM SERPL-SCNC: 3.3 MMOL/L (ref 3.7–5.3)
POTASSIUM SERPL-SCNC: 3.4 MMOL/L (ref 3.7–5.3)
POTASSIUM SERPL-SCNC: 3.5 MMOL/L (ref 3.7–5.3)
POTASSIUM SERPL-SCNC: 3.5 MMOL/L (ref 3.7–5.3)
POTASSIUM SERPL-SCNC: 3.6 MMOL/L (ref 3.7–5.3)
POTASSIUM SERPL-SCNC: 3.7 MMOL/L (ref 3.7–5.3)
POTASSIUM SERPL-SCNC: 4 MMOL/L (ref 3.7–5.3)
POTASSIUM SERPL-SCNC: 4.1 MMOL/L (ref 3.7–5.3)
POTASSIUM SERPL-SCNC: 4.1 MMOL/L (ref 3.7–5.3)
PROTEIN UA: ABNORMAL
RBC # BLD: 3.56 M/UL (ref 4.21–5.77)
RBC # BLD: 3.66 M/UL (ref 4.21–5.77)
RBC # BLD: 3.8 M/UL (ref 4.21–5.77)
RBC # BLD: 3.86 M/UL (ref 4.21–5.77)
RBC # BLD: 4.08 M/UL (ref 4.21–5.77)
RBC # BLD: 4.11 M/UL (ref 4.21–5.77)
RBC # BLD: 4.12 M/UL (ref 4.21–5.77)
RBC # BLD: 4.22 M/UL (ref 4.21–5.77)
RBC # BLD: 4.66 M/UL (ref 4.21–5.77)
RBC # BLD: 4.95 M/UL (ref 4.21–5.77)
RBC # BLD: ABNORMAL 10*6/UL
RBC UA: ABNORMAL /HPF (ref 0–4)
RBC UA: ABNORMAL /HPF (ref 0–4)
RBC UA: NORMAL /HPF (ref 0–4)
REASON FOR REJECTION: NORMAL
RENAL EPITHELIAL, UA: ABNORMAL /HPF
RENAL EPITHELIAL, UA: ABNORMAL /HPF
RENAL EPITHELIAL, UA: NORMAL /HPF
RETIC %: 2.3 % (ref 0.5–1.9)
RETIC HEMOGLOBIN: 37.1 PG (ref 28.2–35.7)
SALMONELLA PCR: NORMAL
SAMPLE SITE: ABNORMAL
SAMPLE SITE: ABNORMAL
SEG NEUTROPHILS: 57 % (ref 36–65)
SEG NEUTROPHILS: 63 % (ref 36–65)
SEG NEUTROPHILS: 64 % (ref 36–65)
SEG NEUTROPHILS: 73 % (ref 36–65)
SEG NEUTROPHILS: 75 % (ref 36–65)
SEG NEUTROPHILS: 84 % (ref 36–65)
SEGMENTED NEUTROPHILS ABSOLUTE COUNT: 3.48 K/UL (ref 1.5–8.1)
SEGMENTED NEUTROPHILS ABSOLUTE COUNT: 4.21 K/UL (ref 1.5–8.1)
SEGMENTED NEUTROPHILS ABSOLUTE COUNT: 4.32 K/UL (ref 1.5–8.1)
SEGMENTED NEUTROPHILS ABSOLUTE COUNT: 5.58 K/UL (ref 1.5–8.1)
SEGMENTED NEUTROPHILS ABSOLUTE COUNT: 5.7 K/UL (ref 1.5–8.1)
SEGMENTED NEUTROPHILS ABSOLUTE COUNT: 7.23 K/UL (ref 1.5–8.1)
SHIGATOXIN GENE PCR: NORMAL
SHIGELLA SP PCR: NORMAL
SODIUM BLD-SCNC: 132 MMOL/L (ref 135–144)
SODIUM BLD-SCNC: 134 MMOL/L (ref 135–144)
SODIUM BLD-SCNC: 134 MMOL/L (ref 135–144)
SODIUM BLD-SCNC: 135 MMOL/L (ref 135–144)
SODIUM BLD-SCNC: 136 MMOL/L (ref 135–144)
SODIUM BLD-SCNC: 137 MMOL/L (ref 135–144)
SODIUM BLD-SCNC: 137 MMOL/L (ref 135–144)
SODIUM BLD-SCNC: 138 MMOL/L (ref 135–144)
SODIUM BLD-SCNC: 139 MMOL/L (ref 135–144)
SODIUM BLD-SCNC: 142 MMOL/L (ref 135–144)
SPECIFIC GRAVITY UA: 1.03 (ref 1–1.03)
SPECIFIC GRAVITY UA: 1.03 (ref 1–1.03)
SPECIFIC GRAVITY UA: 1.04 (ref 1–1.03)
SPECIMEN DESCRIPTION: ABNORMAL
SPECIMEN DESCRIPTION: NORMAL
SPECIMEN: NORMAL
STATUS: ABNORMAL
STATUS: NORMAL
THYROGLOBULIN AB: 1653 IU/ML (ref 0–40)
THYROGLOBULIN: <0.2 NG/ML (ref 0.3–49.7)
THYROID PEROXIDASE (TPO) AB: 355 IU/ML (ref 0–35)
THYROXINE, FREE: 0.15 NG/DL (ref 0.93–1.7)
THYROXINE, FREE: 0.43 NG/DL (ref 0.93–1.7)
TOTAL CO2, VENOUS: 33 MMOL/L (ref 23–30)
TOTAL CO2, VENOUS: 35 MMOL/L (ref 23–30)
TOTAL IRON BINDING CAPACITY: 227 UG/DL (ref 250–450)
TOTAL PROTEIN, URINE: 24 MG/DL
TOTAL PROTEIN: 5.5 G/DL (ref 6.4–8.3)
TOTAL PROTEIN: 6.3 G/DL (ref 6.4–8.3)
TOTAL PROTEIN: 6.5 G/DL (ref 6.4–8.3)
TRICHOMONAS: ABNORMAL
TRICHOMONAS: ABNORMAL
TRICHOMONAS: NORMAL
TROPONIN INTERP: ABNORMAL
TROPONIN T: 0.11 NG/ML
TROPONIN T: 0.11 NG/ML
TROPONIN T: 0.12 NG/ML
TROPONIN T: 0.13 NG/ML
TROPONIN T: 0.14 NG/ML
TROPONIN T: 0.15 NG/ML
TROPONIN T: 0.16 NG/ML
TSH RECEPTOR AB: <0.9 IU/L
TSH SERPL DL<=0.05 MIU/L-ACNC: 67.69 MIU/L (ref 0.3–5)
TSH SERPL DL<=0.05 MIU/L-ACNC: 78.15 MIU/L (ref 0.3–5)
TURBIDITY: CLEAR
UNSATURATED IRON BINDING CAPACITY: 184 UG/DL (ref 112–347)
URINE HGB: NEGATIVE
URINE TOTAL PROTEIN CREATININE RATIO: 0.42 (ref 0–0.2)
UROBILINOGEN, URINE: NORMAL
VITAMIN B-12: 841 PG/ML (ref 232–1245)
WBC # BLD: 10.7 K/UL (ref 3.5–11.3)
WBC # BLD: 6 K/UL (ref 3.5–11.3)
WBC # BLD: 6.6 K/UL (ref 3.5–11.3)
WBC # BLD: 6.7 K/UL (ref 3.5–11.3)
WBC # BLD: 6.9 K/UL (ref 3.5–11.3)
WBC # BLD: 7 K/UL (ref 3.5–11.3)
WBC # BLD: 7.5 K/UL (ref 3.5–11.3)
WBC # BLD: 7.8 K/UL (ref 3.5–11.3)
WBC # BLD: 8.3 K/UL (ref 3.5–11.3)
WBC # BLD: 8.7 K/UL (ref 3.5–11.3)
WBC # BLD: ABNORMAL 10*3/UL
WBC UA: ABNORMAL /HPF (ref 0–5)
WBC UA: ABNORMAL /HPF (ref 0–5)
WBC UA: NORMAL /HPF (ref 0–5)
YEAST: ABNORMAL
YEAST: ABNORMAL
YEAST: NORMAL
ZZ NTE CLEAN UP: ORDERED TEST: NORMAL
ZZ NTE WITH NAME CLEAN UP: SPECIMEN SOURCE: NORMAL

## 2018-01-01 PROCEDURE — 80076 HEPATIC FUNCTION PANEL: CPT

## 2018-01-01 PROCEDURE — 6370000000 HC RX 637 (ALT 250 FOR IP): Performed by: STUDENT IN AN ORGANIZED HEALTH CARE EDUCATION/TRAINING PROGRAM

## 2018-01-01 PROCEDURE — 96372 THER/PROPH/DIAG INJ SC/IM: CPT

## 2018-01-01 PROCEDURE — 99233 SBSQ HOSP IP/OBS HIGH 50: CPT | Performed by: INTERNAL MEDICINE

## 2018-01-01 PROCEDURE — G0378 HOSPITAL OBSERVATION PER HR: HCPCS

## 2018-01-01 PROCEDURE — 2500000003 HC RX 250 WO HCPCS: Performed by: STUDENT IN AN ORGANIZED HEALTH CARE EDUCATION/TRAINING PROGRAM

## 2018-01-01 PROCEDURE — 2580000003 HC RX 258: Performed by: STUDENT IN AN ORGANIZED HEALTH CARE EDUCATION/TRAINING PROGRAM

## 2018-01-01 PROCEDURE — 82947 ASSAY GLUCOSE BLOOD QUANT: CPT

## 2018-01-01 PROCEDURE — 84100 ASSAY OF PHOSPHORUS: CPT

## 2018-01-01 PROCEDURE — 80048 BASIC METABOLIC PNL TOTAL CA: CPT

## 2018-01-01 PROCEDURE — 96366 THER/PROPH/DIAG IV INF ADDON: CPT

## 2018-01-01 PROCEDURE — G8988 SELF CARE GOAL STATUS: HCPCS

## 2018-01-01 PROCEDURE — 82570 ASSAY OF URINE CREATININE: CPT

## 2018-01-01 PROCEDURE — 83036 HEMOGLOBIN GLYCOSYLATED A1C: CPT

## 2018-01-01 PROCEDURE — 96376 TX/PRO/DX INJ SAME DRUG ADON: CPT

## 2018-01-01 PROCEDURE — 71046 X-RAY EXAM CHEST 2 VIEWS: CPT

## 2018-01-01 PROCEDURE — G8979 MOBILITY GOAL STATUS: HCPCS

## 2018-01-01 PROCEDURE — 97535 SELF CARE MNGMENT TRAINING: CPT

## 2018-01-01 PROCEDURE — 36415 COLL VENOUS BLD VENIPUNCTURE: CPT

## 2018-01-01 PROCEDURE — 6370000000 HC RX 637 (ALT 250 FOR IP): Performed by: EMERGENCY MEDICINE

## 2018-01-01 PROCEDURE — 86800 THYROGLOBULIN ANTIBODY: CPT

## 2018-01-01 PROCEDURE — 87449 NOS EACH ORGANISM AG IA: CPT

## 2018-01-01 PROCEDURE — 87505 NFCT AGENT DETECTION GI: CPT

## 2018-01-01 PROCEDURE — 96361 HYDRATE IV INFUSION ADD-ON: CPT

## 2018-01-01 PROCEDURE — 6360000002 HC RX W HCPCS: Performed by: STUDENT IN AN ORGANIZED HEALTH CARE EDUCATION/TRAINING PROGRAM

## 2018-01-01 PROCEDURE — 97166 OT EVAL MOD COMPLEX 45 MIN: CPT | Performed by: OCCUPATIONAL THERAPIST

## 2018-01-01 PROCEDURE — 80053 COMPREHEN METABOLIC PANEL: CPT

## 2018-01-01 PROCEDURE — 2580000003 HC RX 258: Performed by: PHYSICIAN ASSISTANT

## 2018-01-01 PROCEDURE — 82010 KETONE BODYS QUAN: CPT

## 2018-01-01 PROCEDURE — 6370000000 HC RX 637 (ALT 250 FOR IP): Performed by: PHYSICIAN ASSISTANT

## 2018-01-01 PROCEDURE — 83540 ASSAY OF IRON: CPT

## 2018-01-01 PROCEDURE — 1200000000 HC SEMI PRIVATE

## 2018-01-01 PROCEDURE — 82728 ASSAY OF FERRITIN: CPT

## 2018-01-01 PROCEDURE — 81001 URINALYSIS AUTO W/SCOPE: CPT

## 2018-01-01 PROCEDURE — 2580000003 HC RX 258: Performed by: INTERNAL MEDICINE

## 2018-01-01 PROCEDURE — G8987 SELF CARE CURRENT STATUS: HCPCS | Performed by: OCCUPATIONAL THERAPIST

## 2018-01-01 PROCEDURE — 85730 THROMBOPLASTIN TIME PARTIAL: CPT

## 2018-01-01 PROCEDURE — 87102 FUNGUS ISOLATION CULTURE: CPT

## 2018-01-01 PROCEDURE — 2700000000 HC OXYGEN THERAPY PER DAY

## 2018-01-01 PROCEDURE — G0108 DIAB MANAGE TRN  PER INDIV: HCPCS

## 2018-01-01 PROCEDURE — 99283 EMERGENCY DEPT VISIT LOW MDM: CPT

## 2018-01-01 PROCEDURE — 82435 ASSAY OF BLOOD CHLORIDE: CPT

## 2018-01-01 PROCEDURE — 6370000000 HC RX 637 (ALT 250 FOR IP): Performed by: HOSPITALIST

## 2018-01-01 PROCEDURE — 2060000000 HC ICU INTERMEDIATE R&B

## 2018-01-01 PROCEDURE — 99212 OFFICE O/P EST SF 10 MIN: CPT

## 2018-01-01 PROCEDURE — 83520 IMMUNOASSAY QUANT NOS NONAB: CPT

## 2018-01-01 PROCEDURE — 83605 ASSAY OF LACTIC ACID: CPT

## 2018-01-01 PROCEDURE — 83550 IRON BINDING TEST: CPT

## 2018-01-01 PROCEDURE — 6370000000 HC RX 637 (ALT 250 FOR IP): Performed by: INTERNAL MEDICINE

## 2018-01-01 PROCEDURE — G8978 MOBILITY CURRENT STATUS: HCPCS

## 2018-01-01 PROCEDURE — 83690 ASSAY OF LIPASE: CPT

## 2018-01-01 PROCEDURE — 99232 SBSQ HOSP IP/OBS MODERATE 35: CPT | Performed by: INTERNAL MEDICINE

## 2018-01-01 PROCEDURE — 99285 EMERGENCY DEPT VISIT HI MDM: CPT

## 2018-01-01 PROCEDURE — 85027 COMPLETE CBC AUTOMATED: CPT

## 2018-01-01 PROCEDURE — 84484 ASSAY OF TROPONIN QUANT: CPT

## 2018-01-01 PROCEDURE — 97166 OT EVAL MOD COMPLEX 45 MIN: CPT

## 2018-01-01 PROCEDURE — 97116 GAIT TRAINING THERAPY: CPT

## 2018-01-01 PROCEDURE — 85025 COMPLETE CBC W/AUTO DIFF WBC: CPT

## 2018-01-01 PROCEDURE — 93005 ELECTROCARDIOGRAM TRACING: CPT

## 2018-01-01 PROCEDURE — 84439 ASSAY OF FREE THYROXINE: CPT

## 2018-01-01 PROCEDURE — 99239 HOSP IP/OBS DSCHRG MGMT >30: CPT | Performed by: INTERNAL MEDICINE

## 2018-01-01 PROCEDURE — 94760 N-INVAS EAR/PLS OXIMETRY 1: CPT

## 2018-01-01 PROCEDURE — 70450 CT HEAD/BRAIN W/O DYE: CPT

## 2018-01-01 PROCEDURE — 82565 ASSAY OF CREATININE: CPT

## 2018-01-01 PROCEDURE — 93306 TTE W/DOPPLER COMPLETE: CPT

## 2018-01-01 PROCEDURE — 86376 MICROSOMAL ANTIBODY EACH: CPT

## 2018-01-01 PROCEDURE — 83735 ASSAY OF MAGNESIUM: CPT

## 2018-01-01 PROCEDURE — 94762 N-INVAS EAR/PLS OXIMTRY CONT: CPT

## 2018-01-01 PROCEDURE — 99223 1ST HOSP IP/OBS HIGH 75: CPT | Performed by: INTERNAL MEDICINE

## 2018-01-01 PROCEDURE — 84443 ASSAY THYROID STIM HORMONE: CPT

## 2018-01-01 PROCEDURE — 82330 ASSAY OF CALCIUM: CPT

## 2018-01-01 PROCEDURE — 87106 FUNGI IDENTIFICATION YEAST: CPT

## 2018-01-01 PROCEDURE — 82977 ASSAY OF GGT: CPT

## 2018-01-01 PROCEDURE — 72125 CT NECK SPINE W/O DYE: CPT

## 2018-01-01 PROCEDURE — 82607 VITAMIN B-12: CPT

## 2018-01-01 PROCEDURE — 6360000002 HC RX W HCPCS: Performed by: INTERNAL MEDICINE

## 2018-01-01 PROCEDURE — 82746 ASSAY OF FOLIC ACID SERUM: CPT

## 2018-01-01 PROCEDURE — 96365 THER/PROPH/DIAG IV INF INIT: CPT

## 2018-01-01 PROCEDURE — 87324 CLOSTRIDIUM AG IA: CPT

## 2018-01-01 PROCEDURE — G8988 SELF CARE GOAL STATUS: HCPCS | Performed by: OCCUPATIONAL THERAPIST

## 2018-01-01 PROCEDURE — 84156 ASSAY OF PROTEIN URINE: CPT

## 2018-01-01 PROCEDURE — 97162 PT EVAL MOD COMPLEX 30 MIN: CPT

## 2018-01-01 PROCEDURE — 84432 ASSAY OF THYROGLOBULIN: CPT

## 2018-01-01 PROCEDURE — 84132 ASSAY OF SERUM POTASSIUM: CPT

## 2018-01-01 PROCEDURE — 76937 US GUIDE VASCULAR ACCESS: CPT

## 2018-01-01 PROCEDURE — 78452 HT MUSCLE IMAGE SPECT MULT: CPT

## 2018-01-01 PROCEDURE — 97535 SELF CARE MNGMENT TRAINING: CPT | Performed by: OCCUPATIONAL THERAPIST

## 2018-01-01 PROCEDURE — 96375 TX/PRO/DX INJ NEW DRUG ADDON: CPT

## 2018-01-01 PROCEDURE — 97530 THERAPEUTIC ACTIVITIES: CPT

## 2018-01-01 PROCEDURE — 76705 ECHO EXAM OF ABDOMEN: CPT

## 2018-01-01 PROCEDURE — 87086 URINE CULTURE/COLONY COUNT: CPT

## 2018-01-01 PROCEDURE — 87040 BLOOD CULTURE FOR BACTERIA: CPT

## 2018-01-01 PROCEDURE — 93017 CV STRESS TEST TRACING ONLY: CPT | Performed by: NURSE PRACTITIONER

## 2018-01-01 PROCEDURE — 85045 AUTOMATED RETICULOCYTE COUNT: CPT

## 2018-01-01 PROCEDURE — G8987 SELF CARE CURRENT STATUS: HCPCS

## 2018-01-01 PROCEDURE — 51798 US URINE CAPACITY MEASURE: CPT

## 2018-01-01 PROCEDURE — A9500 TC99M SESTAMIBI: HCPCS | Performed by: INTERNAL MEDICINE

## 2018-01-01 PROCEDURE — 3430000000 HC RX DIAGNOSTIC RADIOPHARMACEUTICAL: Performed by: INTERNAL MEDICINE

## 2018-01-01 PROCEDURE — 85014 HEMATOCRIT: CPT

## 2018-01-01 PROCEDURE — 82803 BLOOD GASES ANY COMBINATION: CPT

## 2018-01-01 PROCEDURE — 96367 TX/PROPH/DG ADDL SEQ IV INF: CPT

## 2018-01-01 PROCEDURE — 96368 THER/DIAG CONCURRENT INF: CPT

## 2018-01-01 PROCEDURE — 84295 ASSAY OF SERUM SODIUM: CPT

## 2018-01-01 RX ORDER — ATORVASTATIN CALCIUM 40 MG/1
40 TABLET, FILM COATED ORAL NIGHTLY
Status: DISCONTINUED | OUTPATIENT
Start: 2018-01-01 | End: 2018-01-01

## 2018-01-01 RX ORDER — SODIUM CHLORIDE 0.9 % (FLUSH) 0.9 %
10 SYRINGE (ML) INJECTION EVERY 12 HOURS SCHEDULED
Status: DISCONTINUED | OUTPATIENT
Start: 2018-01-01 | End: 2018-01-01 | Stop reason: HOSPADM

## 2018-01-01 RX ORDER — SODIUM CHLORIDE 0.9 % (FLUSH) 0.9 %
10 SYRINGE (ML) INJECTION PRN
Status: DISCONTINUED | OUTPATIENT
Start: 2018-01-01 | End: 2018-01-01

## 2018-01-01 RX ORDER — INSULIN GLARGINE 100 [IU]/ML
30 INJECTION, SOLUTION SUBCUTANEOUS 2 TIMES DAILY
Status: DISCONTINUED | OUTPATIENT
Start: 2018-01-01 | End: 2018-01-01

## 2018-01-01 RX ORDER — ATORVASTATIN CALCIUM 80 MG/1
80 TABLET, FILM COATED ORAL NIGHTLY
Status: DISCONTINUED | OUTPATIENT
Start: 2018-01-01 | End: 2018-01-01 | Stop reason: HOSPADM

## 2018-01-01 RX ORDER — HYDRALAZINE HYDROCHLORIDE 20 MG/ML
10 INJECTION INTRAMUSCULAR; INTRAVENOUS EVERY 6 HOURS PRN
Status: DISCONTINUED | OUTPATIENT
Start: 2018-01-01 | End: 2018-01-01 | Stop reason: HOSPADM

## 2018-01-01 RX ORDER — AMLODIPINE BESYLATE 10 MG/1
5 TABLET ORAL DAILY
Status: DISCONTINUED | OUTPATIENT
Start: 2018-01-01 | End: 2018-01-01

## 2018-01-01 RX ORDER — POTASSIUM CHLORIDE 20MEQ/15ML
40 LIQUID (ML) ORAL PRN
Status: DISCONTINUED | OUTPATIENT
Start: 2018-01-01 | End: 2018-01-01 | Stop reason: HOSPADM

## 2018-01-01 RX ORDER — HEPARIN SODIUM 1000 [USP'U]/ML
4000 INJECTION, SOLUTION INTRAVENOUS; SUBCUTANEOUS PRN
Status: DISCONTINUED | OUTPATIENT
Start: 2018-01-01 | End: 2018-01-01

## 2018-01-01 RX ORDER — SODIUM CHLORIDE 0.9 % (FLUSH) 0.9 %
10 SYRINGE (ML) INJECTION PRN
Status: DISCONTINUED | OUTPATIENT
Start: 2018-01-01 | End: 2018-01-01 | Stop reason: HOSPADM

## 2018-01-01 RX ORDER — CLOTRIMAZOLE 1 %
CREAM (GRAM) TOPICAL
Qty: 1 TUBE | Refills: 1 | Status: SHIPPED | OUTPATIENT
Start: 2018-01-01 | End: 2018-01-01

## 2018-01-01 RX ORDER — 0.9 % SODIUM CHLORIDE 0.9 %
1000 INTRAVENOUS SOLUTION INTRAVENOUS ONCE
Status: COMPLETED | OUTPATIENT
Start: 2018-01-01 | End: 2018-01-01

## 2018-01-01 RX ORDER — ONDANSETRON 2 MG/ML
4 INJECTION INTRAMUSCULAR; INTRAVENOUS EVERY 8 HOURS PRN
Status: DISCONTINUED | OUTPATIENT
Start: 2018-01-01 | End: 2018-01-01 | Stop reason: HOSPADM

## 2018-01-01 RX ORDER — LABETALOL HYDROCHLORIDE 5 MG/ML
10 INJECTION, SOLUTION INTRAVENOUS EVERY 4 HOURS PRN
Status: DISCONTINUED | OUTPATIENT
Start: 2018-01-01 | End: 2018-01-01 | Stop reason: HOSPADM

## 2018-01-01 RX ORDER — FAMOTIDINE 20 MG/1
20 TABLET, FILM COATED ORAL 2 TIMES DAILY
Status: DISCONTINUED | OUTPATIENT
Start: 2018-01-01 | End: 2018-01-01 | Stop reason: HOSPADM

## 2018-01-01 RX ORDER — NITROGLYCERIN 0.4 MG/1
0.4 TABLET SUBLINGUAL EVERY 5 MIN PRN
Status: DISCONTINUED | OUTPATIENT
Start: 2018-01-01 | End: 2018-01-01

## 2018-01-01 RX ORDER — SODIUM CHLORIDE 450 MG/100ML
INJECTION, SOLUTION INTRAVENOUS CONTINUOUS
Status: DISCONTINUED | OUTPATIENT
Start: 2018-01-01 | End: 2018-01-01 | Stop reason: HOSPADM

## 2018-01-01 RX ORDER — ACETAMINOPHEN 325 MG/1
650 TABLET ORAL EVERY 4 HOURS PRN
Status: DISCONTINUED | OUTPATIENT
Start: 2018-01-01 | End: 2018-01-01 | Stop reason: HOSPADM

## 2018-01-01 RX ORDER — TERBINAFINE HYDROCHLORIDE 250 MG/1
250 TABLET ORAL DAILY
Status: DISCONTINUED | OUTPATIENT
Start: 2018-01-01 | End: 2018-01-01

## 2018-01-01 RX ORDER — INSULIN GLARGINE 100 [IU]/ML
40 INJECTION, SOLUTION SUBCUTANEOUS NIGHTLY
Status: DISCONTINUED | OUTPATIENT
Start: 2018-01-01 | End: 2018-01-01

## 2018-01-01 RX ORDER — SODIUM CHLORIDE 9 MG/ML
INJECTION, SOLUTION INTRAVENOUS CONTINUOUS
Status: DISCONTINUED | OUTPATIENT
Start: 2018-01-01 | End: 2018-01-01 | Stop reason: HOSPADM

## 2018-01-01 RX ORDER — DEXTROSE MONOHYDRATE 25 G/50ML
12.5 INJECTION, SOLUTION INTRAVENOUS PRN
Status: DISCONTINUED | OUTPATIENT
Start: 2018-01-01 | End: 2018-01-01 | Stop reason: HOSPADM

## 2018-01-01 RX ORDER — POTASSIUM CHLORIDE 20 MEQ/1
40 TABLET, EXTENDED RELEASE ORAL PRN
Status: DISCONTINUED | OUTPATIENT
Start: 2018-01-01 | End: 2018-01-01 | Stop reason: HOSPADM

## 2018-01-01 RX ORDER — LANCETS 30 GAUGE
1 EACH MISCELLANEOUS 3 TIMES DAILY
Qty: 600 EACH | Refills: 1 | Status: SHIPPED | OUTPATIENT
Start: 2018-01-01

## 2018-01-01 RX ORDER — LISINOPRIL 5 MG/1
5 TABLET ORAL DAILY
Status: DISCONTINUED | OUTPATIENT
Start: 2018-01-01 | End: 2018-01-01

## 2018-01-01 RX ORDER — INSULIN GLARGINE 100 [IU]/ML
55 INJECTION, SOLUTION SUBCUTANEOUS NIGHTLY
Status: DISCONTINUED | OUTPATIENT
Start: 2018-01-01 | End: 2018-01-01 | Stop reason: HOSPADM

## 2018-01-01 RX ORDER — POTASSIUM CHLORIDE 20 MEQ/1
60 TABLET, EXTENDED RELEASE ORAL ONCE
Status: COMPLETED | OUTPATIENT
Start: 2018-01-01 | End: 2018-01-01

## 2018-01-01 RX ORDER — NICOTINE POLACRILEX 4 MG
15 LOZENGE BUCCAL PRN
Status: DISCONTINUED | OUTPATIENT
Start: 2018-01-01 | End: 2018-01-01 | Stop reason: HOSPADM

## 2018-01-01 RX ORDER — POTASSIUM CHLORIDE 7.45 MG/ML
20 INJECTION INTRAVENOUS PRN
Status: DISCONTINUED | OUTPATIENT
Start: 2018-01-01 | End: 2018-01-01

## 2018-01-01 RX ORDER — ATORVASTATIN CALCIUM 80 MG/1
80 TABLET, FILM COATED ORAL NIGHTLY
Qty: 30 TABLET | Refills: 3 | Status: ON HOLD | OUTPATIENT
Start: 2018-01-01 | End: 2019-01-01

## 2018-01-01 RX ORDER — LISINOPRIL 5 MG/1
5 TABLET ORAL DAILY
Qty: 30 TABLET | Refills: 3 | Status: ON HOLD | OUTPATIENT
Start: 2018-01-01 | End: 2018-01-01 | Stop reason: HOSPADM

## 2018-01-01 RX ORDER — ASPIRIN 81 MG/1
324 TABLET, CHEWABLE ORAL ONCE
Status: COMPLETED | OUTPATIENT
Start: 2018-01-01 | End: 2018-01-01

## 2018-01-01 RX ORDER — HEPARIN SODIUM 10000 [USP'U]/100ML
12 INJECTION, SOLUTION INTRAVENOUS CONTINUOUS
Status: DISCONTINUED | OUTPATIENT
Start: 2018-01-01 | End: 2018-01-01

## 2018-01-01 RX ORDER — ACETAMINOPHEN 325 MG/1
650 TABLET ORAL ONCE
Status: COMPLETED | OUTPATIENT
Start: 2018-01-01 | End: 2018-01-01

## 2018-01-01 RX ORDER — FLUTICASONE PROPIONATE 50 MCG
1 SPRAY, SUSPENSION (ML) NASAL DAILY
Status: DISCONTINUED | OUTPATIENT
Start: 2018-01-01 | End: 2018-01-01 | Stop reason: HOSPADM

## 2018-01-01 RX ORDER — AMINOPHYLLINE DIHYDRATE 25 MG/ML
100 INJECTION, SOLUTION INTRAVENOUS
Status: DISCONTINUED | OUTPATIENT
Start: 2018-01-01 | End: 2018-01-01

## 2018-01-01 RX ORDER — INSULIN GLARGINE 100 [IU]/ML
20 INJECTION, SOLUTION SUBCUTANEOUS ONCE
Status: COMPLETED | OUTPATIENT
Start: 2018-01-01 | End: 2018-01-01

## 2018-01-01 RX ORDER — FLUCONAZOLE 200 MG/1
200 TABLET ORAL DAILY
Status: DISCONTINUED | OUTPATIENT
Start: 2018-01-01 | End: 2018-01-01

## 2018-01-01 RX ORDER — POTASSIUM CHLORIDE 7.45 MG/ML
10 INJECTION INTRAVENOUS PRN
Status: DISCONTINUED | OUTPATIENT
Start: 2018-01-01 | End: 2018-01-01 | Stop reason: HOSPADM

## 2018-01-01 RX ORDER — METOPROLOL TARTRATE 5 MG/5ML
2.5 INJECTION INTRAVENOUS PRN
Status: DISCONTINUED | OUTPATIENT
Start: 2018-01-01 | End: 2018-01-01

## 2018-01-01 RX ORDER — HEPARIN SODIUM 1000 [USP'U]/ML
2000 INJECTION, SOLUTION INTRAVENOUS; SUBCUTANEOUS PRN
Status: DISCONTINUED | OUTPATIENT
Start: 2018-01-01 | End: 2018-01-01

## 2018-01-01 RX ORDER — FLUCONAZOLE 150 MG/1
150 TABLET ORAL WEEKLY
Qty: 4 TABLET | Refills: 0 | Status: SHIPPED | OUTPATIENT
Start: 2018-01-01 | End: 2019-01-01

## 2018-01-01 RX ORDER — POTASSIUM CHLORIDE 20 MEQ/1
40 TABLET, EXTENDED RELEASE ORAL ONCE
Status: DISCONTINUED | OUTPATIENT
Start: 2018-01-01 | End: 2018-01-01 | Stop reason: HOSPADM

## 2018-01-01 RX ORDER — 0.9 % SODIUM CHLORIDE 0.9 %
2500 INTRAVENOUS SOLUTION INTRAVENOUS ONCE
Status: COMPLETED | OUTPATIENT
Start: 2018-01-01 | End: 2018-01-01

## 2018-01-01 RX ORDER — HEPARIN SODIUM 5000 [USP'U]/ML
5000 INJECTION, SOLUTION INTRAVENOUS; SUBCUTANEOUS EVERY 8 HOURS SCHEDULED
Status: DISCONTINUED | OUTPATIENT
Start: 2018-01-01 | End: 2018-01-01 | Stop reason: HOSPADM

## 2018-01-01 RX ORDER — ACETAMINOPHEN 325 MG/1
650 TABLET ORAL EVERY 4 HOURS PRN
Status: DISCONTINUED | OUTPATIENT
Start: 2018-01-01 | End: 2018-01-01

## 2018-01-01 RX ORDER — POTASSIUM CHLORIDE 7.45 MG/ML
10 INJECTION INTRAVENOUS PRN
Status: DISCONTINUED | OUTPATIENT
Start: 2018-01-01 | End: 2018-01-01

## 2018-01-01 RX ORDER — LISINOPRIL 20 MG/1
20 TABLET ORAL DAILY
Status: DISCONTINUED | OUTPATIENT
Start: 2018-01-01 | End: 2018-01-01

## 2018-01-01 RX ORDER — CLOTRIMAZOLE 1 %
CREAM (GRAM) TOPICAL 2 TIMES DAILY
Status: DISCONTINUED | OUTPATIENT
Start: 2018-01-01 | End: 2018-01-01 | Stop reason: HOSPADM

## 2018-01-01 RX ORDER — LEVOTHYROXINE SODIUM 137 UG/1
137 TABLET ORAL DAILY
Qty: 30 TABLET | Refills: 0 | Status: ON HOLD | OUTPATIENT
Start: 2018-01-01 | End: 2019-01-01

## 2018-01-01 RX ORDER — HEPARIN SODIUM 1000 [USP'U]/ML
4000 INJECTION, SOLUTION INTRAVENOUS; SUBCUTANEOUS ONCE
Status: COMPLETED | OUTPATIENT
Start: 2018-01-01 | End: 2018-01-01

## 2018-01-01 RX ORDER — LISINOPRIL 40 MG/1
40 TABLET ORAL DAILY
Qty: 30 TABLET | Refills: 0 | Status: ON HOLD | OUTPATIENT
Start: 2018-01-01 | End: 2019-01-01

## 2018-01-01 RX ORDER — POTASSIUM CHLORIDE 20 MEQ/1
40 TABLET, EXTENDED RELEASE ORAL ONCE
Status: COMPLETED | OUTPATIENT
Start: 2018-01-01 | End: 2018-01-01

## 2018-01-01 RX ORDER — LEVOTHYROXINE SODIUM ANHYDROUS 100 UG/5ML
50 INJECTION, POWDER, LYOPHILIZED, FOR SOLUTION INTRAVENOUS DAILY
Status: DISCONTINUED | OUTPATIENT
Start: 2018-01-01 | End: 2018-01-01

## 2018-01-01 RX ORDER — 0.9 % SODIUM CHLORIDE 0.9 %
5 VIAL (ML) INJECTION DAILY
Status: DISCONTINUED | OUTPATIENT
Start: 2018-01-01 | End: 2018-01-01

## 2018-01-01 RX ORDER — MAGNESIUM SULFATE 1 G/100ML
1 INJECTION INTRAVENOUS PRN
Status: DISCONTINUED | OUTPATIENT
Start: 2018-01-01 | End: 2018-01-01 | Stop reason: HOSPADM

## 2018-01-01 RX ORDER — DEXTROSE MONOHYDRATE 50 MG/ML
100 INJECTION, SOLUTION INTRAVENOUS PRN
Status: DISCONTINUED | OUTPATIENT
Start: 2018-01-01 | End: 2018-01-01 | Stop reason: HOSPADM

## 2018-01-01 RX ORDER — INSULIN GLARGINE 100 [IU]/ML
55 INJECTION, SOLUTION SUBCUTANEOUS NIGHTLY
Status: DISCONTINUED | OUTPATIENT
Start: 2018-01-01 | End: 2018-01-01

## 2018-01-01 RX ORDER — INSULIN GLARGINE 100 [IU]/ML
30 INJECTION, SOLUTION SUBCUTANEOUS 2 TIMES DAILY
Status: DISCONTINUED | OUTPATIENT
Start: 2018-01-01 | End: 2018-01-01 | Stop reason: HOSPADM

## 2018-01-01 RX ORDER — SODIUM CHLORIDE 0.9 % (FLUSH) 0.9 %
10 SYRINGE (ML) INJECTION EVERY 12 HOURS SCHEDULED
Status: DISCONTINUED | OUTPATIENT
Start: 2018-01-01 | End: 2018-01-01

## 2018-01-01 RX ORDER — LISINOPRIL 5 MG/1
5 TABLET ORAL DAILY
Status: DISCONTINUED | OUTPATIENT
Start: 2018-01-01 | End: 2018-01-01 | Stop reason: HOSPADM

## 2018-01-01 RX ORDER — ITRACONAZOLE 100 MG/1
200 CAPSULE ORAL DAILY
Status: DISCONTINUED | OUTPATIENT
Start: 2018-01-01 | End: 2018-01-01

## 2018-01-01 RX ORDER — AMLODIPINE BESYLATE 5 MG/1
5 TABLET ORAL DAILY
Status: DISCONTINUED | OUTPATIENT
Start: 2018-01-01 | End: 2018-01-01

## 2018-01-01 RX ORDER — SODIUM CHLORIDE 9 MG/ML
INJECTION, SOLUTION INTRAVENOUS CONTINUOUS
Status: ACTIVE | OUTPATIENT
Start: 2018-01-01 | End: 2018-01-01

## 2018-01-01 RX ORDER — INSULIN GLARGINE 100 [IU]/ML
35 INJECTION, SOLUTION SUBCUTANEOUS 2 TIMES DAILY
Status: DISCONTINUED | OUTPATIENT
Start: 2018-01-01 | End: 2018-01-01

## 2018-01-01 RX ORDER — LISINOPRIL 20 MG/1
40 TABLET ORAL DAILY
Status: DISCONTINUED | OUTPATIENT
Start: 2018-01-01 | End: 2018-01-01 | Stop reason: HOSPADM

## 2018-01-01 RX ORDER — IBUPROFEN 200 MG
1 TABLET ORAL PRN
Qty: 1 EACH | Refills: 0 | Status: SHIPPED | OUTPATIENT
Start: 2018-01-01

## 2018-01-01 RX ORDER — ONDANSETRON 2 MG/ML
4 INJECTION INTRAMUSCULAR; INTRAVENOUS EVERY 6 HOURS PRN
Status: DISCONTINUED | OUTPATIENT
Start: 2018-01-01 | End: 2018-01-01 | Stop reason: HOSPADM

## 2018-01-01 RX ORDER — INSULIN GLARGINE 100 [IU]/ML
36 INJECTION, SOLUTION SUBCUTANEOUS 2 TIMES DAILY
Status: DISCONTINUED | OUTPATIENT
Start: 2018-01-01 | End: 2018-01-01

## 2018-01-01 RX ORDER — ECHINACEA PURPUREA EXTRACT 125 MG
1 TABLET ORAL PRN
Status: DISCONTINUED | OUTPATIENT
Start: 2018-01-01 | End: 2018-01-01 | Stop reason: HOSPADM

## 2018-01-01 RX ORDER — DEXTROSE, SODIUM CHLORIDE, AND POTASSIUM CHLORIDE 5; .45; .15 G/100ML; G/100ML; G/100ML
INJECTION INTRAVENOUS CONTINUOUS PRN
Status: DISCONTINUED | OUTPATIENT
Start: 2018-01-01 | End: 2018-01-01

## 2018-01-01 RX ORDER — LISINOPRIL 20 MG/1
20 TABLET ORAL DAILY
Qty: 30 TABLET | Refills: 3 | Status: SHIPPED | OUTPATIENT
Start: 2018-01-01 | End: 2018-01-01

## 2018-01-01 RX ORDER — LISINOPRIL 10 MG/1
10 TABLET ORAL DAILY
Status: DISCONTINUED | OUTPATIENT
Start: 2018-01-01 | End: 2018-01-01

## 2018-01-01 RX ORDER — SODIUM CHLORIDE 9 MG/ML
INJECTION, SOLUTION INTRAVENOUS ONCE
Status: DISCONTINUED | OUTPATIENT
Start: 2018-01-01 | End: 2018-01-01

## 2018-01-01 RX ORDER — METFORMIN HYDROCHLORIDE 500 MG/1
1000 TABLET, EXTENDED RELEASE ORAL 2 TIMES DAILY WITH MEALS
Status: DISCONTINUED | OUTPATIENT
Start: 2018-01-01 | End: 2018-01-01 | Stop reason: HOSPADM

## 2018-01-01 RX ADMIN — LISINOPRIL 5 MG: 5 TABLET ORAL at 08:52

## 2018-01-01 RX ADMIN — CLOTRIMAZOLE: 10 CREAM TOPICAL at 13:56

## 2018-01-01 RX ADMIN — POTASSIUM CHLORIDE 10 MEQ: 7.46 INJECTION, SOLUTION INTRAVENOUS at 20:01

## 2018-01-01 RX ADMIN — INSULIN LISPRO 3 UNITS: 100 INJECTION, SOLUTION INTRAVENOUS; SUBCUTANEOUS at 21:30

## 2018-01-01 RX ADMIN — DESMOPRESSIN ACETATE 40 MG: 0.2 TABLET ORAL at 20:29

## 2018-01-01 RX ADMIN — Medication 12 UNITS/KG/HR: at 04:45

## 2018-01-01 RX ADMIN — CLOTRIMAZOLE: 10 CREAM TOPICAL at 09:00

## 2018-01-01 RX ADMIN — ENOXAPARIN SODIUM 40 MG: 40 INJECTION SUBCUTANEOUS at 09:06

## 2018-01-01 RX ADMIN — AMLODIPINE BESYLATE 5 MG: 5 TABLET ORAL at 12:01

## 2018-01-01 RX ADMIN — LISINOPRIL 5 MG: 5 TABLET ORAL at 16:28

## 2018-01-01 RX ADMIN — SODIUM CHLORIDE: 4.5 INJECTION, SOLUTION INTRAVENOUS at 18:46

## 2018-01-01 RX ADMIN — FAMOTIDINE 20 MG: 20 TABLET, FILM COATED ORAL at 20:58

## 2018-01-01 RX ADMIN — METOPROLOL TARTRATE 25 MG: 25 TABLET, FILM COATED ORAL at 09:18

## 2018-01-01 RX ADMIN — LISINOPRIL 40 MG: 20 TABLET ORAL at 08:22

## 2018-01-01 RX ADMIN — POTASSIUM CHLORIDE 40 MEQ: 1500 TABLET, EXTENDED RELEASE ORAL at 09:00

## 2018-01-01 RX ADMIN — INSULIN LISPRO 1 UNITS: 100 INJECTION, SOLUTION INTRAVENOUS; SUBCUTANEOUS at 12:18

## 2018-01-01 RX ADMIN — LISINOPRIL 20 MG: 20 TABLET ORAL at 09:05

## 2018-01-01 RX ADMIN — CLOTRIMAZOLE: 10 CREAM TOPICAL at 17:19

## 2018-01-01 RX ADMIN — FAMOTIDINE 20 MG: 20 TABLET, FILM COATED ORAL at 09:27

## 2018-01-01 RX ADMIN — TETRAKIS(2-METHOXYISOBUTYLISOCYANIDE)COPPER(I) TETRAFLUOROBORATE 49 MILLICURIE: 1 INJECTION, POWDER, LYOPHILIZED, FOR SOLUTION INTRAVENOUS at 12:55

## 2018-01-01 RX ADMIN — CLOTRIMAZOLE: 10 CREAM TOPICAL at 21:32

## 2018-01-01 RX ADMIN — ATORVASTATIN CALCIUM 80 MG: 80 TABLET, FILM COATED ORAL at 21:05

## 2018-01-01 RX ADMIN — LEVOTHYROXINE SODIUM ANHYDROUS 50 MCG: 100 INJECTION, POWDER, LYOPHILIZED, FOR SOLUTION INTRAVENOUS at 17:02

## 2018-01-01 RX ADMIN — POTASSIUM CHLORIDE 40 MEQ: 20 TABLET, EXTENDED RELEASE ORAL at 08:52

## 2018-01-01 RX ADMIN — SODIUM CHLORIDE 5 ML: 9 INJECTION INTRAMUSCULAR; INTRAVENOUS; SUBCUTANEOUS at 17:06

## 2018-01-01 RX ADMIN — INSULIN GLARGINE 36 UNITS: 100 INJECTION, SOLUTION SUBCUTANEOUS at 09:06

## 2018-01-01 RX ADMIN — METOPROLOL TARTRATE 12.5 MG: 25 TABLET ORAL at 08:52

## 2018-01-01 RX ADMIN — INSULIN LISPRO 3 UNITS: 100 INJECTION, SOLUTION INTRAVENOUS; SUBCUTANEOUS at 18:06

## 2018-01-01 RX ADMIN — INSULIN LISPRO 3 UNITS: 100 INJECTION, SOLUTION INTRAVENOUS; SUBCUTANEOUS at 21:05

## 2018-01-01 RX ADMIN — LEVOTHYROXINE SODIUM 137 MCG: 112 TABLET ORAL at 12:01

## 2018-01-01 RX ADMIN — FAMOTIDINE 20 MG: 20 TABLET, FILM COATED ORAL at 09:06

## 2018-01-01 RX ADMIN — SALINE NASAL SPRAY 1 SPRAY: 1.5 SOLUTION NASAL at 08:53

## 2018-01-01 RX ADMIN — ATORVASTATIN CALCIUM 80 MG: 80 TABLET, FILM COATED ORAL at 21:00

## 2018-01-01 RX ADMIN — LEVOTHYROXINE SODIUM 137 MCG: 112 TABLET ORAL at 08:52

## 2018-01-01 RX ADMIN — Medication 10 UNITS/KG/HR: at 22:03

## 2018-01-01 RX ADMIN — Medication 10 ML: at 12:01

## 2018-01-01 RX ADMIN — ENOXAPARIN SODIUM 40 MG: 40 INJECTION SUBCUTANEOUS at 17:03

## 2018-01-01 RX ADMIN — SODIUM CHLORIDE: 9 INJECTION, SOLUTION INTRAVENOUS at 02:58

## 2018-01-01 RX ADMIN — Medication 10 ML: at 09:00

## 2018-01-01 RX ADMIN — LEVOTHYROXINE SODIUM ANHYDROUS 50 MCG: 100 INJECTION, POWDER, LYOPHILIZED, FOR SOLUTION INTRAVENOUS at 06:49

## 2018-01-01 RX ADMIN — INSULIN LISPRO 2 UNITS: 100 INJECTION, SOLUTION INTRAVENOUS; SUBCUTANEOUS at 16:36

## 2018-01-01 RX ADMIN — METOPROLOL TARTRATE 12.5 MG: 25 TABLET ORAL at 21:00

## 2018-01-01 RX ADMIN — SODIUM CHLORIDE, PRESERVATIVE FREE 10 ML: 5 INJECTION INTRAVENOUS at 12:55

## 2018-01-01 RX ADMIN — INSULIN LISPRO 1 UNITS: 100 INJECTION, SOLUTION INTRAVENOUS; SUBCUTANEOUS at 09:01

## 2018-01-01 RX ADMIN — SODIUM CHLORIDE 2500 ML: 9 INJECTION, SOLUTION INTRAVENOUS at 15:03

## 2018-01-01 RX ADMIN — SODIUM CHLORIDE 5.1 UNITS/HR: 9 INJECTION, SOLUTION INTRAVENOUS at 21:45

## 2018-01-01 RX ADMIN — INSULIN GLARGINE 55 UNITS: 100 INJECTION, SOLUTION SUBCUTANEOUS at 21:37

## 2018-01-01 RX ADMIN — SODIUM CHLORIDE, PRESERVATIVE FREE 10 ML: 5 INJECTION INTRAVENOUS at 11:20

## 2018-01-01 RX ADMIN — INSULIN LISPRO 5 UNITS: 100 INJECTION, SOLUTION INTRAVENOUS; SUBCUTANEOUS at 12:49

## 2018-01-01 RX ADMIN — ITRACONAZOLE 200 MG: 100 CAPSULE ORAL at 14:23

## 2018-01-01 RX ADMIN — TETRAKIS(2-METHOXYISOBUTYLISOCYANIDE)COPPER(I) TETRAFLUOROBORATE 14.7 MILLICURIE: 1 INJECTION, POWDER, LYOPHILIZED, FOR SOLUTION INTRAVENOUS at 11:20

## 2018-01-01 RX ADMIN — POTASSIUM CHLORIDE 40 MEQ: 20 TABLET, EXTENDED RELEASE ORAL at 06:46

## 2018-01-01 RX ADMIN — POTASSIUM CHLORIDE 10 MEQ: 7.46 INJECTION, SOLUTION INTRAVENOUS at 21:17

## 2018-01-01 RX ADMIN — HEPARIN SODIUM 2000 UNITS: 1000 INJECTION, SOLUTION INTRAVENOUS; SUBCUTANEOUS at 03:25

## 2018-01-01 RX ADMIN — INSULIN GLARGINE 30 UNITS: 100 INJECTION, SOLUTION SUBCUTANEOUS at 10:46

## 2018-01-01 RX ADMIN — SALINE NASAL SPRAY 1 SPRAY: 1.5 SOLUTION NASAL at 05:13

## 2018-01-01 RX ADMIN — MAGNESIUM SULFATE HEPTAHYDRATE 1 G: 1 INJECTION, SOLUTION INTRAVENOUS at 23:18

## 2018-01-01 RX ADMIN — INSULIN GLARGINE 30 UNITS: 100 INJECTION, SOLUTION SUBCUTANEOUS at 21:05

## 2018-01-01 RX ADMIN — MAGNESIUM SULFATE HEPTAHYDRATE 1 G: 1 INJECTION, SOLUTION INTRAVENOUS at 00:39

## 2018-01-01 RX ADMIN — INSULIN GLARGINE 36 UNITS: 100 INJECTION, SOLUTION SUBCUTANEOUS at 21:32

## 2018-01-01 RX ADMIN — METOPROLOL TARTRATE 25 MG: 25 TABLET, FILM COATED ORAL at 08:22

## 2018-01-01 RX ADMIN — ENOXAPARIN SODIUM 40 MG: 40 INJECTION SUBCUTANEOUS at 08:22

## 2018-01-01 RX ADMIN — CLOTRIMAZOLE: 10 CREAM TOPICAL at 13:57

## 2018-01-01 RX ADMIN — POTASSIUM CHLORIDE 10 MEQ: 7.46 INJECTION, SOLUTION INTRAVENOUS at 22:29

## 2018-01-01 RX ADMIN — INSULIN LISPRO 2 UNITS: 100 INJECTION, SOLUTION INTRAVENOUS; SUBCUTANEOUS at 12:41

## 2018-01-01 RX ADMIN — Medication 12 UNITS/KG/HR: at 15:04

## 2018-01-01 RX ADMIN — METFORMIN HYDROCHLORIDE 1000 MG: 500 TABLET, EXTENDED RELEASE ORAL at 12:41

## 2018-01-01 RX ADMIN — POTASSIUM CHLORIDE 10 MEQ: 7.46 INJECTION, SOLUTION INTRAVENOUS at 23:59

## 2018-01-01 RX ADMIN — ENOXAPARIN SODIUM 40 MG: 40 INJECTION SUBCUTANEOUS at 09:08

## 2018-01-01 RX ADMIN — ATORVASTATIN CALCIUM 80 MG: 80 TABLET, FILM COATED ORAL at 21:02

## 2018-01-01 RX ADMIN — METOPROLOL TARTRATE 25 MG: 25 TABLET, FILM COATED ORAL at 21:32

## 2018-01-01 RX ADMIN — INSULIN LISPRO 6 UNITS: 100 INJECTION, SOLUTION INTRAVENOUS; SUBCUTANEOUS at 17:12

## 2018-01-01 RX ADMIN — IRON SUCROSE 200 MG: 20 INJECTION, SOLUTION INTRAVENOUS at 09:09

## 2018-01-01 RX ADMIN — INSULIN GLARGINE 55 UNITS: 100 INJECTION, SOLUTION SUBCUTANEOUS at 23:59

## 2018-01-01 RX ADMIN — AMLODIPINE BESYLATE 5 MG: 5 TABLET ORAL at 08:52

## 2018-01-01 RX ADMIN — INSULIN LISPRO 10 UNITS: 100 INJECTION, SOLUTION INTRAVENOUS; SUBCUTANEOUS at 13:05

## 2018-01-01 RX ADMIN — METFORMIN HYDROCHLORIDE 1000 MG: 500 TABLET ORAL at 08:21

## 2018-01-01 RX ADMIN — SODIUM CHLORIDE 0.1 UNITS/KG/HR: 9 INJECTION, SOLUTION INTRAVENOUS at 16:18

## 2018-01-01 RX ADMIN — INSULIN GLARGINE 36 UNITS: 100 INJECTION, SOLUTION SUBCUTANEOUS at 20:59

## 2018-01-01 RX ADMIN — FAMOTIDINE 20 MG: 20 TABLET, FILM COATED ORAL at 21:32

## 2018-01-01 RX ADMIN — Medication 10 ML: at 20:59

## 2018-01-01 RX ADMIN — SODIUM PHOSPHATE, MONOBASIC, MONOHYDRATE 15 MMOL: 276; 142 INJECTION, SOLUTION INTRAVENOUS at 22:28

## 2018-01-01 RX ADMIN — METOPROLOL TARTRATE 25 MG: 25 TABLET, FILM COATED ORAL at 21:02

## 2018-01-01 RX ADMIN — INSULIN LISPRO 9 UNITS: 100 INJECTION, SOLUTION INTRAVENOUS; SUBCUTANEOUS at 09:23

## 2018-01-01 RX ADMIN — LEVOTHYROXINE SODIUM 137 MCG: 112 TABLET ORAL at 09:00

## 2018-01-01 RX ADMIN — METOPROLOL TARTRATE 25 MG: 25 TABLET, FILM COATED ORAL at 09:05

## 2018-01-01 RX ADMIN — POTASSIUM CHLORIDE 40 MEQ: 20 TABLET, EXTENDED RELEASE ORAL at 06:54

## 2018-01-01 RX ADMIN — INSULIN LISPRO 1 UNITS: 100 INJECTION, SOLUTION INTRAVENOUS; SUBCUTANEOUS at 07:45

## 2018-01-01 RX ADMIN — INSULIN LISPRO 6 UNITS: 100 INJECTION, SOLUTION INTRAVENOUS; SUBCUTANEOUS at 13:18

## 2018-01-01 RX ADMIN — HEPARIN SODIUM 2000 UNITS: 1000 INJECTION, SOLUTION INTRAVENOUS; SUBCUTANEOUS at 08:45

## 2018-01-01 RX ADMIN — FAMOTIDINE 20 MG: 20 TABLET, FILM COATED ORAL at 08:22

## 2018-01-01 RX ADMIN — Medication 10 ML: at 21:33

## 2018-01-01 RX ADMIN — DESMOPRESSIN ACETATE 40 MG: 0.2 TABLET ORAL at 21:37

## 2018-01-01 RX ADMIN — ATORVASTATIN CALCIUM 80 MG: 80 TABLET, FILM COATED ORAL at 21:32

## 2018-01-01 RX ADMIN — MUPIROCIN: 20 OINTMENT TOPICAL at 21:00

## 2018-01-01 RX ADMIN — AMLODIPINE BESYLATE 5 MG: 5 TABLET ORAL at 20:29

## 2018-01-01 RX ADMIN — INSULIN GLARGINE 40 UNITS: 100 INJECTION, SOLUTION SUBCUTANEOUS at 21:07

## 2018-01-01 RX ADMIN — POTASSIUM CHLORIDE 40 MEQ: 20 TABLET, EXTENDED RELEASE ORAL at 10:13

## 2018-01-01 RX ADMIN — CLOTRIMAZOLE: 10 CREAM TOPICAL at 08:22

## 2018-01-01 RX ADMIN — FAMOTIDINE 20 MG: 20 TABLET, FILM COATED ORAL at 21:06

## 2018-01-01 RX ADMIN — SODIUM CHLORIDE: 9 INJECTION, SOLUTION INTRAVENOUS at 12:02

## 2018-01-01 RX ADMIN — INSULIN GLARGINE 30 UNITS: 100 INJECTION, SOLUTION SUBCUTANEOUS at 09:19

## 2018-01-01 RX ADMIN — ENOXAPARIN SODIUM 40 MG: 40 INJECTION SUBCUTANEOUS at 13:55

## 2018-01-01 RX ADMIN — Medication 10 ML: at 08:22

## 2018-01-01 RX ADMIN — ITRACONAZOLE 200 MG: 100 CAPSULE ORAL at 08:52

## 2018-01-01 RX ADMIN — INSULIN LISPRO 6 UNITS: 100 INJECTION, SOLUTION INTRAVENOUS; SUBCUTANEOUS at 12:42

## 2018-01-01 RX ADMIN — INSULIN LISPRO 3 UNITS: 100 INJECTION, SOLUTION INTRAVENOUS; SUBCUTANEOUS at 12:00

## 2018-01-01 RX ADMIN — INSULIN LISPRO 2 UNITS: 100 INJECTION, SOLUTION INTRAVENOUS; SUBCUTANEOUS at 21:36

## 2018-01-01 RX ADMIN — LEVOTHYROXINE SODIUM ANHYDROUS 50 MCG: 100 INJECTION, POWDER, LYOPHILIZED, FOR SOLUTION INTRAVENOUS at 09:27

## 2018-01-01 RX ADMIN — INSULIN GLARGINE 20 UNITS: 100 INJECTION, SOLUTION SUBCUTANEOUS at 12:46

## 2018-01-01 RX ADMIN — LEVOTHYROXINE SODIUM 137 MCG: 112 TABLET ORAL at 08:53

## 2018-01-01 RX ADMIN — FLUCONAZOLE 150 MG: 100 TABLET ORAL at 16:28

## 2018-01-01 RX ADMIN — ACETAMINOPHEN 650 MG: 325 TABLET ORAL at 13:29

## 2018-01-01 RX ADMIN — LISINOPRIL 5 MG: 5 TABLET ORAL at 09:06

## 2018-01-01 RX ADMIN — ASPIRIN 81 MG 324 MG: 81 TABLET ORAL at 13:29

## 2018-01-01 RX ADMIN — SODIUM CHLORIDE 5 ML: 9 INJECTION INTRAMUSCULAR; INTRAVENOUS; SUBCUTANEOUS at 09:27

## 2018-01-01 RX ADMIN — LEVOTHYROXINE SODIUM 137 MCG: 112 TABLET ORAL at 06:15

## 2018-01-01 RX ADMIN — SODIUM CHLORIDE: 9 INJECTION, SOLUTION INTRAVENOUS at 15:15

## 2018-01-01 RX ADMIN — MAGNESIUM SULFATE HEPTAHYDRATE 1 G: 1 INJECTION, SOLUTION INTRAVENOUS at 22:02

## 2018-01-01 RX ADMIN — LISINOPRIL 20 MG: 20 TABLET ORAL at 13:54

## 2018-01-01 RX ADMIN — POTASSIUM CHLORIDE, DEXTROSE MONOHYDRATE AND SODIUM CHLORIDE: 150; 5; 450 INJECTION, SOLUTION INTRAVENOUS at 20:01

## 2018-01-01 RX ADMIN — ATORVASTATIN CALCIUM 80 MG: 80 TABLET, FILM COATED ORAL at 20:59

## 2018-01-01 RX ADMIN — INSULIN LISPRO 2 UNITS: 100 INJECTION, SOLUTION INTRAVENOUS; SUBCUTANEOUS at 21:33

## 2018-01-01 RX ADMIN — METOPROLOL TARTRATE 25 MG: 25 TABLET, FILM COATED ORAL at 21:29

## 2018-01-01 RX ADMIN — INSULIN LISPRO 3 UNITS: 100 INJECTION, SOLUTION INTRAVENOUS; SUBCUTANEOUS at 17:58

## 2018-01-01 RX ADMIN — HEPARIN SODIUM 2000 UNITS: 1000 INJECTION, SOLUTION INTRAVENOUS; SUBCUTANEOUS at 04:46

## 2018-01-01 RX ADMIN — FAMOTIDINE 20 MG: 20 TABLET, FILM COATED ORAL at 21:02

## 2018-01-01 RX ADMIN — IRON SUCROSE 200 MG: 20 INJECTION, SOLUTION INTRAVENOUS at 09:17

## 2018-01-01 RX ADMIN — HEPARIN SODIUM 4000 UNITS: 1000 INJECTION, SOLUTION INTRAVENOUS; SUBCUTANEOUS at 15:04

## 2018-01-01 RX ADMIN — INSULIN LISPRO 12 UNITS: 100 INJECTION, SOLUTION INTRAVENOUS; SUBCUTANEOUS at 09:02

## 2018-01-01 RX ADMIN — REGADENOSON 0.4 MG: 0.08 INJECTION, SOLUTION INTRAVENOUS at 11:20

## 2018-01-01 RX ADMIN — INSULIN LISPRO 1 UNITS: 100 INJECTION, SOLUTION INTRAVENOUS; SUBCUTANEOUS at 20:54

## 2018-01-01 RX ADMIN — POTASSIUM CHLORIDE 40 MEQ: 40 SOLUTION ORAL at 06:25

## 2018-01-01 RX ADMIN — METOPROLOL TARTRATE 25 MG: 25 TABLET, FILM COATED ORAL at 20:58

## 2018-01-01 RX ADMIN — CLOTRIMAZOLE: 10 CREAM TOPICAL at 20:59

## 2018-01-01 RX ADMIN — SODIUM CHLORIDE 5 ML: 9 INJECTION INTRAMUSCULAR; INTRAVENOUS; SUBCUTANEOUS at 06:49

## 2018-01-01 RX ADMIN — POTASSIUM CHLORIDE 60 MEQ: 20 TABLET, EXTENDED RELEASE ORAL at 20:29

## 2018-01-01 RX ADMIN — METOPROLOL TARTRATE 25 MG: 25 TABLET, FILM COATED ORAL at 09:06

## 2018-01-01 RX ADMIN — METOPROLOL TARTRATE 12.5 MG: 25 TABLET, FILM COATED ORAL at 17:01

## 2018-01-01 RX ADMIN — SODIUM CHLORIDE 1000 ML: 9 INJECTION, SOLUTION INTRAVENOUS at 12:17

## 2018-01-01 ASSESSMENT — ENCOUNTER SYMPTOMS
COUGH: 0
CONSTIPATION: 0
SINUS PRESSURE: 0
ABDOMINAL DISTENTION: 0
SHORTNESS OF BREATH: 0
SORE THROAT: 0
RECTAL PAIN: 0
NAUSEA: 0
RHINORRHEA: 0
BACK PAIN: 0
CHEST TIGHTNESS: 0
FACIAL SWELLING: 0
BLOOD IN STOOL: 0
SHORTNESS OF BREATH: 0
BACK PAIN: 0
PHOTOPHOBIA: 0
ABDOMINAL PAIN: 0
DIARRHEA: 1
CHOKING: 0
SORE THROAT: 0
CHEST TIGHTNESS: 0
ABDOMINAL PAIN: 0
APNEA: 0
COLOR CHANGE: 0
CONSTIPATION: 0
NAUSEA: 0
COLOR CHANGE: 0
SINUS PAIN: 0
DIARRHEA: 0
APNEA: 0
COUGH: 0
VOMITING: 0
NAUSEA: 0
EYE DISCHARGE: 0
COUGH: 0
DIARRHEA: 0
EYE REDNESS: 0
VOMITING: 0
RHINORRHEA: 0
ABDOMINAL PAIN: 0
EYE PAIN: 0
ABDOMINAL DISTENTION: 0
SHORTNESS OF BREATH: 0
VOMITING: 0
SORE THROAT: 0

## 2018-01-01 ASSESSMENT — PAIN SCALES - GENERAL
PAINLEVEL_OUTOF10: 5
PAINLEVEL_OUTOF10: 0
PAINLEVEL_OUTOF10: 5
PAINLEVEL_OUTOF10: 0

## 2018-01-01 ASSESSMENT — PAIN DESCRIPTION - PAIN TYPE: TYPE: ACUTE PAIN

## 2018-01-01 ASSESSMENT — PAIN DESCRIPTION - LOCATION: LOCATION: HEAD

## 2018-06-05 ENCOUNTER — TELEPHONE (OUTPATIENT)
Dept: INTERNAL MEDICINE | Age: 53
End: 2018-06-05

## 2018-06-11 ENCOUNTER — TELEPHONE (OUTPATIENT)
Dept: INTERNAL MEDICINE | Age: 53
End: 2018-06-11

## 2018-11-28 PROBLEM — R73.9 HYPERGLYCEMIA: Status: ACTIVE | Noted: 2018-01-01

## 2018-11-28 NOTE — ED NOTES
Report called to 4B RN.      Arash Jennings, RN  11/28/18 [address] Yoselin Dominique RN  11/28/18 [id number]

## 2018-11-28 NOTE — ED PROVIDER NOTES
for level 5)     Vitals:    11/28/18 1213 11/28/18 1448   BP: (!) 154/106 (!) 151/109   Pulse: 105 94   Resp: 18    Temp: 96.8 °F (36 °C)    TempSrc: Oral    SpO2: 97% 98%   Weight: 170 lb (77.1 kg)    Height: 5' 8\" (1.727 m)        Physical Exam   Constitutional: He is oriented to person, place, and time. He appears well-developed and well-nourished. No distress. HENT:   Head: Normocephalic. Head is with abrasion and with contusion. Head is without raccoon's eyes, without Gudino's sign, without laceration, without right periorbital erythema and without left periorbital erythema. Right Ear: External ear normal.   Left Ear: External ear normal.   Mouth/Throat: Oropharynx is clear and moist. No oropharyngeal exudate. Eyes: Pupils are equal, round, and reactive to light. Conjunctivae and EOM are normal.   No hyphema. Pupils are round and reactive to light. No eye entrapment. Neck: Normal range of motion. Neck supple. Cardiovascular: Normal rate, regular rhythm, normal heart sounds and intact distal pulses. Exam reveals no gallop and no friction rub. No murmur heard. Pulmonary/Chest: Effort normal and breath sounds normal. No respiratory distress. He has no wheezes. He has no rales. He exhibits no tenderness. Abdominal: Soft. Bowel sounds are normal. He exhibits no distension and no mass. There is no tenderness. There is no rebound and no guarding. No hernia. Musculoskeletal: Normal range of motion. Patient is neurovascular intact.  strength is normal. Motor function is 5 out of 5 bilaterally in all extremities. No cervical midline tenderness. Distal pulses and deep tendon reflexes are within normal limits. All compartments are soft compressible. No septic joints noted. Cap refills less than 2 seconds in all extremities. Light sensation is intact. Proprioception is within normal limits. Neurological: He is alert and oriented to person, place, and time. He displays normal reflexes.  No cranial nerve deficit or sensory deficit. He exhibits normal muscle tone. Coordination normal.   No focal neurologic deficits. No ataxia. No one-sided weakness. Cerebellar function testing is normal. Romberg is negative. No pronator drift. Skin: Skin is warm and dry. Capillary refill takes less than 2 seconds. He is not diaphoretic. Psychiatric: He has a normal mood and affect. His behavior is normal. Judgment and thought content normal.         DIAGNOSTIC RESULTS     EKG: All EKG's are interpreted by Anjel Vo PA-C in theabsence of a cardiologist.    Reviewed by attending physician. RADIOLOGY:   Non-plain film images such asCT, Ultrasound and MRI are read by the radiologist. Plain radiographic images are visualized and preliminarily interpreted by Anjel Vo PA-C with the below findings:    See below. Interpretation per the Radiologist below, if available at the time of this note:    CT Head WO Contrast   Final Result   No acute intracranial abnormality. Left supraorbital soft tissue swelling. Redemonstration of calcified vascular abnormality in the left basal ganglia   and left orbit, as seen previously. CT CERVICAL SPINE WO CONTRAST   Final Result   No acute abnormality of the cervical spine. Degenerative changes at C5-C6. XR CHEST STANDARD (2 VW)   Final Result   No evidence for acute cardiopulmonary pathology.                LABS:  Labs Reviewed   SODIUM (POC) - Abnormal; Notable for the following:        Result Value    POC Sodium 132 (*)     All other components within normal limits   POTASSIUM (POC) - Abnormal; Notable for the following:     POC Potassium 3.4 (*)     All other components within normal limits   CHLORIDE (POC) - Abnormal; Notable for the following:     POC Chloride 92 (*)     All other components within normal limits   CALCIUM, IONIC (POC) - Abnormal; Notable for the following:     POC Ionized Calcium 1.03 (*)     All other components within normal limits   CBC WITH AUTO DIFFERENTIAL - Abnormal; Notable for the following:     MCHC 35.8 (*)     Seg Neutrophils 84 (*)     Lymphocytes 10 (*)     Eosinophils % 0 (*)     Absolute Lymph # 0.91 (*)     All other components within normal limits   COMPREHENSIVE METABOLIC PANEL - Abnormal; Notable for the following:     Glucose 623 (*)     CREATININE 0.67 (*)     Sodium 132 (*)     Potassium 3.5 (*)     Chloride 90 (*)     Alkaline Phosphatase 208 (*)     ALT 90 (*)     Total Protein 6.3 (*)     All other components within normal limits   BETA-HYDROXYBUTYRATE - Abnormal; Notable for the following:     Beta-Hydroxybutyrate 0.57 (*)     All other components within normal limits   TROPONIN - Abnormal; Notable for the following:     Troponin T 0.13 (*)     All other components within normal limits   CBC - Abnormal; Notable for the following:     Hematocrit 39.0 (*)     MCHC 36.7 (*)     All other components within normal limits   HGB/HCT - Abnormal; Notable for the following:     POC Hematocrit 40 (*)     All other components within normal limits   SODIUM (POC) - Abnormal; Notable for the following:     POC Sodium 135 (*)     All other components within normal limits   POTASSIUM (POC) - Abnormal; Notable for the following:     POC Potassium 3.1 (*)     All other components within normal limits   CHLORIDE (POC) - Abnormal; Notable for the following:     POC Chloride 92 (*)     All other components within normal limits   CALCIUM, IONIC (POC) - Abnormal; Notable for the following:     POC Ionized Calcium 1.08 (*)     All other components within normal limits   VENOUS BLOOD GAS, POINT OF CARE - Abnormal; Notable for the following:     pH, Dmitry 7.498 (*)     pO2, Dmitry 53.3 (*)     HCO3, Venous 31.9 (*)     Total CO2, Venous 33 (*)     Positive Base Excess, Dmitry 8 (*)     O2 Sat, Dmitry 90 (*)     All other components within normal limits   LACTIC ACID,POINT OF CARE - Abnormal; Notable for the following:     POC Lactic Acid 2.88 (*) All other components within normal limits   POCT GLUCOSE - Abnormal; Notable for the following:     POC Glucose 587 (*)     All other components within normal limits   VENOUS BLOOD GAS, POINT OF CARE - Abnormal; Notable for the following:     pCO2, Dmitry 51.3 (*)     pO2, Dmitry 27.3 (*)     HCO3, Venous 33.4 (*)     Total CO2, Venous 35 (*)     Positive Base Excess, Dmitry 7 (*)     O2 Sat, Dmitry 51 (*)     All other components within normal limits   LACTIC ACID,POINT OF CARE - Abnormal; Notable for the following:     POC Lactic Acid 3.27 (*)     All other components within normal limits   POCT GLUCOSE - Abnormal; Notable for the following:     POC Glucose 511 (*)     All other components within normal limits   CULTURE BLOOD #1   CULTURE BLOOD #2   CULTURE STOOL   C DIFF TOXIN/ANTIGEN   HGB/HCT   URINALYSIS WITH MICROSCOPIC   TROPONIN   APTT   APTT   APTT   BASIC METABOLIC PANEL   BASIC METABOLIC PANEL   BASIC METABOLIC PANEL   MAGNESIUM   MAGNESIUM   MAGNESIUM   PHOSPHORUS   PHOSPHORUS   PHOSPHORUS   HEMOGLOBIN A1C   TROPONIN   TROPONIN   CREATININE W/GFR POINT OF CARE   ANION GAP (CALC) POC   POCT TROPONIN   POCT TROPONIN   CREATININE W/GFR POINT OF CARE   ANION GAP (CALC) POC   POCT GLUCOSE   POCT GLUCOSE           EMERGENCY DEPARTMENT COURSE and DIFFERENTIAL DIAGNOSIS/MDM:   Vitals:    Vitals:    11/28/18 1213 11/28/18 1448   BP: (!) 154/106 (!) 151/109   Pulse: 105 94   Resp: 18    Temp: 96.8 °F (36 °C)    TempSrc: Oral    SpO2: 97% 98%   Weight: 170 lb (77.1 kg)    Height: 5' 8\" (1.727 m)        This is a 57-year-old male presenting to the emergency department complaining of a head injury status post fall today. We will obtain laboratory studies and CT imaging of his brain and cervical spine at this time. His blood glucoses almost 600 and we will provide him with IV fluids at this time Along with 10 units of subcutaneous insulin. His troponin is 0.13 at this time and he does have a slightly low sodium and potassium.

## 2018-11-28 NOTE — H&P
[address] [facility]    HISTORY AND PHYSICALEXAMINATION            Date:   11/28/2018  Patient name:  Loly Paul  Date of admission:  11/28/2018 12:02 PM  MRN:   [id number]  Account:  [de-identified]  YOB: 1965  PCP:   ENEDELIA Emerson CNP  Room:   18/18  Code Status:    Full Code    Chief Complaint:     Chief Complaint   Patient presents with    Fall     Pt c/o frequent falls, states he fell 2 times today. Pt reports that he tripped and fell, denies hitting head or LOC    Hyperglycemia     Pt c/o hyperglycemia, states that he has not taken his insulin in 3 weeks d/t not being able to afford it       HistoryObtained From:     patient    History ofPresent Illness: The patient is a 52 y.o. Non-/non  male who presents with Fall (Pt c/o frequent falls, states he fell 2 times today. Pt reports that he tripped and fell, denies hitting head or LOC) and Hyperglycemia (Pt c/o hyperglycemia, states that he has not taken his insulin in 3 weeks d/t not being able to afford it)   and he is admitted to the hospital for the management of  Hyperglycemia. The patient mentioned above past medical history of diabetes mellitus type 2 and hypertension, admitted to the hospital due to falling twice in the in the morning. The patient is poor historian. The patient is homeless and is sleeping in his relative's car. The patient stated that at 10:30 in the morning, the patient fell down after he got out of the car, and he hit his head, he didn't slip, he didn't lost his consciousness, no dizziness, no headache, no palpitation, no seizures, no weakness or numbness. The patient stated that he felt weak the last 3 weeks. He stated that it was associated with diarrhea for 3 weeks, large in amount, no blood or mucus, no abdomen pain, no nausea or vomiting. He stated also that he started to have frequency of urination the last week.   No bloody urine or dysuria. The patient is noncompliant with medications due to the cost.  He stated that he can not afford them. He supposed to be on insulin glargine 55 units, Januva and pioglitazone. He is not following any PCP. He is not smoker and he is not drinking alcohol. He stated that he is using Marijuana 3 times per week. No history of COPD. He denied chest pain, shortness of breath, loss of appetite, change in weight, change in vision, tinnitus or hearing loss, fever or chills. In the ER,  The patient was alert oriented and vitally stable  Bruises around the left eye. Cardiac and respiratory exams were unremarkable  Abdominal exam was soft and lax  Lower limb exam showed multiple venous ulcers with lesions of psoriasis, also noted fungal infection between the toes and under the nails, +2 lower limb edema     CT scan of the brain was negative, CT C -spine was negative for acute changes,   Chest x-ray was unremarkable, CBC was within normal limits  Troponin was elevated 0.13, repeated troponin was 0.15  BMP showed glucose of 623, sodium 132, potassium 3.5, liver function tests showed   Beat hydroxybutyrate was 0.57, VBG showed pH of 7.5, HCO3 32, CO2 41, lactic acid is 2.88, anion gap 10. We started the patient on DKA protocol, IV fluid, heparin drip, cardiology consult.      Past Medical History:     Past Medical History:   Diagnosis Date    Chronic back pain     Diet-controlled type 2 diabetes mellitus [facility]) 2011 Oct     GERD (gastroesophageal reflux disease)     Hypertension     Hypertriglyceridemia     Hypothyroidism     IDDM (insulin dependent diabetes mellitus) ([facility] [address]     Osteoarthritis     Psoriasis     Type II or unspecified type diabetes mellitus without mention of complication, not stated as uncontrolled     Unspecified sleep apnea         Past Surgical History:     Past Surgical History:   Procedure Laterality Date    PALATE SURGERY  2012        Medications Prior to Admission:     Prior to Admission medications    Medication Sig Start Date End Date Taking? Authorizing Provider   atorvastatin (LIPITOR) 40 MG tablet Take 1 tablet by mouth daily 9/20/17   Jazlyn Felix MD   levothyroxine (SYNTHROID) 137 MCG tablet Take 1 tablet by mouth daily 9/20/17   Jazlyn Felix MD   pioglitazone (ACTOS) 45 MG tablet Take 1 tablet by mouth daily 9/20/17   Jazlyn Felix MD   hydrochlorothiazide (HYDRODIURIL) 25 MG tablet TAKE ONE TABLET BY MOUTH ONCE A DAY 9/20/17   Jazlyn Felix MD   insulin glargine (LANTUS SOLOSTAR) 100 UNIT/ML injection pen Inject 55 Units into the skin daily 9/20/17   Jazlyn Felix MD   SITagliptin (JANUVIA) 50 MG tablet Take 1 tablet by mouth daily 9/20/17   Jazlyn Felix MD   gabapentin (NEURONTIN) 100 MG capsule Take 1 capsule by mouth 3 times daily 8/18/17   Jazlyn Felix MD   CLICKFINE PEN NEEDLES 31G X 6 MM MISC USE WITH LANTUS AND NOVOLOG AS DIRECTED 4/5/17   ENEDELIA Meza CNP   glucose blood VI test strips (ASCENSIA AUTODISC VI;ONE TOUCH ULTRA TEST VI) strip Use with associated glucose meter. 10/6/15   ENEDELIA Burr CNP   Lancets MISC Check blood glucose TID 10/6/15   ENEDELIA Jain CNP        Allergies:     Metformin and related and Pcn [penicillins]    Social History:     Tobacco:    reports that he has never smoked. He has never used smokeless tobacco.  Alcohol:      reports that he does not drink alcohol. Drug Use:  reports that he uses drugs, including Marijuana. Family History:     Family History   Problem Relation Age of Onset    Diabetes Mother     Hypertension Mother     Diabetes Father     Diabetes Brother     Hypertension Brother        Review of Systems:     Positive and Negative as described in HPI. Review of Systems   Constitutional: Positive for activity change, appetite change and fatigue. Negative for chills, diaphoresis, fever and unexpected weight change.    HENT: Negative for Glucose 623 (HH) 70 - 99 mg/dL    BUN 7 6 - 20 mg/dL    CREATININE 0.67 (L) 0.70 - 1.20 mg/dL    Bun/Cre Ratio NOT REPORTED 9 - 20    Calcium 9.2 8.6 - 10.4 mg/dL    Sodium 132 (L) 135 - 144 mmol/L    Potassium 3.5 (L) 3.7 - 5.3 mmol/L    Chloride 90 (L) 98 - 107 mmol/L    CO2 27 20 - 31 mmol/L    Anion Gap 15 9 - 17 mmol/L    Alkaline Phosphatase 208 (H) 40 - 129 U/L    ALT 90 (H) 5 - 41 U/L    AST 19 <40 U/L    Total Bilirubin 0.97 0.3 - 1.2 mg/dL    Total Protein 6.3 (L) 6.4 - 8.3 g/dL    Alb 3.8 3.5 - 5.2 g/dL    Albumin/Globulin Ratio 1.5 1.0 - 2.5    GFR Non-African American >60 >60 mL/min    GFR African American >60 >60 mL/min    GFR Comment          GFR Staging NOT REPORTED    BETA-HYDROXYBUTYRATE    Collection Time: 11/28/18 12:33 PM   Result Value Ref Range    Beta-Hydroxybutyrate 0.57 (H) 0.02 - 0.27 mmol/L   Troponin    Collection Time: 11/28/18 12:33 PM   Result Value Ref Range    Troponin T 0.13 (HH) <0.03 ng/mL    Troponin Interp         Venous Blood Gas, POC    Collection Time: 11/28/18  2:43 PM   Result Value Ref Range    pH, Dmitry 7.421 7.320 - 7.430    pCO2, Dmitry 51.3 (H) 41.0 - 51.0 mm Hg    pO2, Dmitry 27.3 (L) 30.0 - 50.0 mm Hg    HCO3, Venous 33.4 (H) 22.0 - 29.0 mmol/L    Total CO2, Venous 35 (H) 23.0 - 30.0 mmol/L    Negative Base Excess, Dmitry NOT REPORTED 0.0 - 2.0    Positive Base Excess, Dmitry 7 (H) 0.0 - 3.0    O2 Sat, Dmitry 51 (L) 60.0 - 85.0 %    O2 Device/Flow/% NOT REPORTED     Conrado Test NOT REPORTED     Sample Site NOT REPORTED     Mode NOT REPORTED     FIO2 NOT REPORTED     Pt Temp NOT REPORTED     POC pH Temp NOT REPORTED     POC pCO2 Temp NOT REPORTED mm Hg    POC pO2 Temp NOT REPORTED mm Hg   Hemoglobin and hematocrit, blood    Collection Time: 11/28/18  2:43 PM   Result Value Ref Range    POC Hemoglobin 13.7 13.5 - 17.5 g/dL    POC Hematocrit 40 (L) 41 - 53 %   Creatinine W/GFR Point of Care    Collection Time: 11/28/18  2:43 PM   Result Value Ref Range    POC Creatinine 0.87 0.51 - 1.19 mg/dL    GFR Comment >60 >60 mL/min    GFR Non-African American >60 >60 mL/min    GFR Comment         SODIUM (POC)    Collection Time: 11/28/18  2:43 PM   Result Value Ref Range    POC Sodium 135 (L) 138 - 146 mmol/L   POTASSIUM (POC)    Collection Time: 11/28/18  2:43 PM   Result Value Ref Range    POC Potassium 3.1 (L) 3.5 - 4.5 mmol/L   CHLORIDE (POC)    Collection Time: 11/28/18  2:43 PM   Result Value Ref Range    POC Chloride 92 (L) 98 - 107 mmol/L   CALCIUM, IONIC (POC)    Collection Time: 11/28/18  2:43 PM   Result Value Ref Range    POC Ionized Calcium 1.08 (L) 1.15 - 1.33 mmol/L   Lactic Acid, POC    Collection Time: 11/28/18  2:43 PM   Result Value Ref Range    POC Lactic Acid 3.27 (H) 0.56 - 1.39 mmol/L   POCT Glucose    Collection Time: 11/28/18  2:43 PM   Result Value Ref Range    POC Glucose 511 (HH) 74 - 100 mg/dL   Anion Gap (Calc) POC    Collection Time: 11/28/18  2:43 PM   Result Value Ref Range    Anion Gap 10 7 - 16 mmol/L   Troponin    Collection Time: 11/28/18  2:51 PM   Result Value Ref Range    Troponin T 0.15 (HH) <0.03 ng/mL    Troponin Interp         CBC    Collection Time: 11/28/18  2:51 PM   Result Value Ref Range    WBC 10.7 3.5 - 11.3 k/uL    RBC 4.66 4.21 - 5.77 m/uL    Hemoglobin 14.3 13.0 - 17.0 g/dL    Hematocrit 39.0 (L) 40.7 - 50.3 %    MCV 83.7 82.6 - 102.9 fL    MCH 30.7 25.2 - 33.5 pg    MCHC 36.7 (H) 28.4 - 34.8 g/dL    RDW 12.8 11.8 - 14.4 %    Platelets 763 517 - 243 k/uL    MPV 10.1 8.1 - 13.5 fL    NRBC Automated 0.0 0.0 per 100 WBC       Imaging/Diagnostics:  Xr Chest Standard (2 Vw)    Result Date: 11/28/2018  EXAMINATION: TWO VIEWS OF THE CHEST 11/28/2018 12:53 pm COMPARISON: 09/29/2015 HISTORY: ORDERING SYSTEM PROVIDED HISTORY: fall TECHNOLOGIST PROVIDED HISTORY: fall FINDINGS: Frontal and lateral views of the chest are submitted for review. The cardiac silhouette is normal in size.   Lung parenchyma is clear without focal airspace consolidation, sizeable

## 2018-11-29 NOTE — PROGRESS NOTES
needle tips   ___ Reinforce safe needle / sharps disposal    _x__ Insulin injection site rotation  Encouraged other sites and handout given. _x Medications - Orals           patient was familiar with Metformin but did not recognize Januvia as medication for his DM,. Following Support materials were provided for patient to take home: Patient preoccupied with eating and most of education focused on brother. Brother admitted that he could not read so used visuals with with pictures. _x__ Educational Booklet \"Diabetes Leaving the Hospital\"  _x__ Be safe with [place] teaching sheet from [facility] Life  _x__\"Type 2 Diabetes and Adding Insulin\" take home kit with survival skills fold out, self care diary for blood glucose and food, Diabetes ID card  _x__ [facility] Diabetes Education brochure/ contact card      Patient needs reinforcement. understanding  of survival skills education. Patient was not a good historian and unsure of cognitive level? Brother who helps to care for him and lives out of his car was receptive to education but also focused on other  family medical issues and loss of several members , debt and dodging issues with the law.     RECOMMENDATIONS INPATIENT PLAN:  _x__ Dietician Consult this admission for education on CHO diet and diet plan for home    RN Bedside Support Plan:  _x__ Bedside RN to support patient with administration of insulin at bedside  _x__ Bedside RN to support patient with SMBG - - Reinforce target BG ranges, Hyper and Hypo signs and symptoms and treatment and how to use a home BG meter  _x_ Bedside RN to coordinate BG Check with mealtime and insulin  x__ Bedside RN to ensure snack at HS for patient on HS correction scale insulin  _x_ Bedside RN to reinforce survival skills education and diabetes self care    __x_ set up patient to watch Education [employer] [facility] [employer] and [address] at [address] [place] [address], 3pm,  7pm, 9pm)         RECOMMENDATIONS FOR OUTPATIENT PLAN:  Diabetes Self-Monitoring Supplies:  _x__ Preferred / formulary blood glucose meter for BGSM at home use    _x__ Strips and lancets for 3  frequency of home BGSM    Diabetes Medications:  __x_ Insulin Pen   Basal / long acting - dose per MD   Takes 55 units Lantus in the am daily when he has the insulin.  ___ Insulin Pen or Vial  Bolus pre meal set dose  or correction scale - dose per MD  _x__ Insulin Delivery method - Pens -   order Insulin Pen Needle 31G X 4 mm MISC      Diabetes Education / HCP follow -up :   _x_ Would recommend follow -up education at outpatient diabetes education at [place] [id number]. An ordered is needed for this service and can be placed via EHR. Discharge Navigator --- Med Reconciliation -- New orders for discharge tab -- search diabetic ed - [id number] -  [facility]  - review and sign     _x__ Follow -up with HCP / PCP within one week.     Jorge Luis Davis RN

## 2018-11-29 NOTE — PROGRESS NOTES
[address] [address] Continence Nurse  Consult Note       NAME:  Amparo Jolly RECORD NUMBER:  [id number]  AGE: 52 y.o. GENDER: male  : 1965  TODAY'S DATE:  2018    Subjective:     Reason for WOCN Evaluation and Assessment: anterior bilateral ankle unstageable pressure injuries r/t patient's foot wear, ST 3 pressure injury to the right heel, BLE calf/pre-tibial eschar covered wounds of unclear origin. Psoriatic lesion left pre-tibial. Dry fungal like infection bilateral feet and toes.        Silvano Mcdonnell is a 52 y.o. male referred by:   [x] Physician  [] Nursing  [] Other:     Wound Identification:  Wound Type: pressure, non-healing/non-surgical and undetermined  Contributing Factors: edema, venous stasis, diabetes, poor glucose control, chronic pressure, decreased mobility, shear force, obesity, decreased tissue oxygenation, incontinence of stool, incontinence of urine, poor hygiene and non-adherence        PAST MEDICAL HISTORY        Diagnosis Date    Chronic back pain     Diet-controlled type 2 diabetes mellitus ([facility] 2011 Oct     GERD (gastroesophageal reflux disease)     Hypertension     Hypertriglyceridemia     Hypothyroidism     IDDM (insulin dependent diabetes mellitus) ([facility]     Osteoarthritis     Psoriasis     Type II or unspecified type diabetes mellitus without mention of complication, not stated as uncontrolled     Unspecified sleep apnea        PAST SURGICAL HISTORY    Past Surgical History:   Procedure Laterality Date    PALATE SURGERY         FAMILY HISTORY    Family History   Problem Relation Age of Onset    Diabetes Mother     Hypertension Mother     Diabetes Father     Diabetes Brother     Hypertension Brother        SOCIAL HISTORY    Social History   Substance Use Topics    Smoking status: Never Smoker    Smokeless tobacco: Never Used    Alcohol use No       ALLERGIES    Allergies   Allergen Reactions    Metformin And Related Diarrhea     Churning Assessment Clean;Dry; Intact;Edema   Black%Wound Bed 100   Wound 11/29/18 Pretibial Right   Date First Assessed/Time First Assessed: 11/29/18 1357   Present on Hospital Admission: Yes  Wound Approximate Age at First Assessment (Weeks): 3 weeks  Location: Pretibial  Wound Location Orientation: Right   Dressing Changed Changed/New   Dressing/Treatment Wound gel;Tegaderm   Wound Cleansed Wound cleanser   Dressing Change Due 11/30/18   Wound Length (cm) 2.5 cm   Wound Width (cm) 2 cm   Wound Surface Area (cm^2) 5 cm^2   Wound Assessment Dry;Red;Black   Drainage Amount None   Odor None   Devorah-wound Assessment Red; Intact   Wound 11/29/18 Ankle Anterior; Left   Date First Assessed/Time First Assessed: 11/29/18 1359   Present on Hospital Admission: Yes  Wound Approximate Age at First Assessment (Weeks): 3 weeks  Primary Wound Type: Pressure Injury  Location: Ankle  Wound Location Orientation: Anterior; Left   Wound Unstageable   Dressing Changed Changed/New   Dressing/Treatment Wound gel;Tegaderm   Wound Cleansed Wound cleanser   Dressing Change Due 11/30/18   Wound Length (cm) 3 cm   Wound Width (cm) 2 cm   Wound Surface Area (cm^2) 6 cm^2   Wound Assessment Dry;Black; Brown   Drainage Amount None   Odor None   Devorah-wound Assessment Clean;Dry;Edema; Intact   Wound 11/29/18 Leg Left;Medial;Lower   Date First Assessed/Time First Assessed: 11/29/18 1359   Present on Hospital Admission: Yes  Wound Approximate Age at First Assessment (Weeks): 3 weeks  Location: Leg  Wound Location Orientation: Left;Medial;Lower   Dressing Status Changed   Dressing Changed Changed/New   Dressing/Treatment Wound gel;Tegaderm   Wound Cleansed Wound cleanser   Dressing Change Due 11/30/18   Wound Length (cm) 5 cm   Wound Width (cm) 2 cm   Wound Depth (cm) 0.1 cm   Wound Surface Area (cm^2) 10 cm^2   Wound Volume (cm^3) 1 cm^3   Wound Assessment Red;Black   Drainage Amount None   Odor None   Devorah-wound Assessment Red;Dry; Intact;Edema   Wound 11/29/18 Heel Right   Date First Assessed/Time First Assessed: 11/29/18 1400   Present on Hospital Admission: Yes  Wound Approximate Age at First Assessment (Weeks): 3 weeks  Primary Wound Type: Pressure Injury  Location: Heel  Wound Location Orientation: Right   Wound Pressure Stage  3   Dressing Status Changed   Dressing Changed Changed/New   Dressing/Treatment Foam   Wound Cleansed Wound cleanser   Dressing Change Due 12/02/18   Wound Length (cm) 1 cm   Wound Width (cm) 1.5 cm   Wound Depth (cm) 0.2 cm   Wound Surface Area (cm^2) 1.5 cm^2   Wound Volume (cm^3) 0.3 cm^3   Wound Assessment Pink;Yellow;Fibrin   Devorah-wound Assessment Dry;Pink       Response to treatment:  Well tolerated by patient. Plan:     Plan of Care:   Hydrogel sheets applied to the lower leg wounds under Tegaderm to promote autolysis of the eschar.   Foam dressing to the right heel, pillow placed under calf to float the heel off of the bed surface    Specialty Bed Required : Yes: Isogel surface in use   [] Low Air Loss   [x] Pressure Redistribution  [] Fluid Immersion  [] Bariatric  [] Total Pressure Relief  [] Other:     Discharge Plan:  TBD    Patient/Caregiver Teaching:    [] Indicates understanding       [x] Needs reinforcement  [] Unsuccessful      [] Verbal Understanding  [] Demonstrated understanding       [] No evidence of learning  [] Refused teaching         [] N/A       Electronically signed by Kimberli Bearden RN, Alyssa Calloway on 11/29/2018 at 2:05 PM

## 2018-11-29 NOTE — PROGRESS NOTES
Nutrition Assessment    Type and Reason for Visit: Initial, Positive Nutrition Screen    Nutrition Recommendations: Continue current diet order. Recommend Glucerna supplement on all meal trays. Nutrition Assessment:  Pt nutritionally compromised AEB multiple wounds, stage II PU, C.diff and homelessness. At risk for further compromise d/t increased nutrient needs for wound healing and infection. Will start Glucerna to promote wound healing. Malnutrition Assessment:  · Malnutrition Status: At risk for malnutrition  · Context: Social or environmental circumstances  · Findings of the 6 clinical characteristics of malnutrition (Minimum of 2 out of 6 clinical characteristics is required to make the diagnosis of moderate or severe Protein Calorie Malnutrition based on AND/ASPEN Guidelines):  1. Energy Intake- , not able to assess    2. Weight Loss-No significant weight loss,    3. Fat Loss-No significant subcutaneous fat loss,    4. Muscle Loss-No significant muscle mass loss,    5. Fluid Accumulation- (mild to moderate fluid accumulation), Extremities  6.   Strength-Not measured    Nutrition Risk Level: High    Nutrient Needs:  · Estimated Daily Total Kcal: 6797-6581 kcals/d (AF x1.2-1.3)  · Estimated Daily Protein (g): 104 gms (1.5 gm/kg)    Nutrition Diagnosis:   · Problem: Increased nutrient needs  · Etiology: related to wounds   Signs and symptoms:  as evidenced by Presence of wounds (Stage III PU)    Objective Information:     · Wound Type: Pressure Ulcer, Stage III, Unstageable, Multiple  · Current Nutrition Therapies:  · Oral Diet Orders: Carb Control 4 Carbs/Meal   · Oral Diet intake: Unable to assess  · Oral Nutrition Supplement (ONS) Orders: None  · Anthropometric Measures:  · Ht: 5' 8\" (172.7 cm)   · Current Body Wt: 170 lb (77.1 kg)  · Admission Body Wt: 170 lb (77.1 kg)     · Ideal Body Wt: 154 lb (69.9 kg), % Ideal Body 110%  · BMI Classification: BMI 25.0 - 29.9 Overweight    Nutrition

## 2018-11-30 PROBLEM — I25.10 ASCVD (ARTERIOSCLEROTIC CARDIOVASCULAR DISEASE): Status: ACTIVE | Noted: 2018-01-01

## 2018-11-30 PROBLEM — I50.40 COMBINED SYSTOLIC AND DIASTOLIC CONGESTIVE HEART FAILURE (HCC): Status: ACTIVE | Noted: 2018-01-01

## 2018-11-30 PROBLEM — I21.4 NSTEMI (NON-ST ELEVATED MYOCARDIAL INFARCTION) (HCC): Status: ACTIVE | Noted: 2018-01-01

## 2018-11-30 NOTE — PROGRESS NOTES
PVR >625 ml, ordered straight cath prn for residuals > 350 ml. Jerome Asencio M.D.  PGY-3 Internal Medicine  [address]

## 2018-11-30 NOTE — PROGRESS NOTES
Bladder scan showing > 625 mLs PVR. Internal medicine resident notified via [address] [place] [address]. Awaiting further orders.

## 2018-11-30 NOTE — PROGRESS NOTES
onset seizure ([facility]    Diabetes type 2, uncontrolled ([facility]    ASCVD (arteriosclerotic cardiovascular disease) 10-year cardiovascular risk 24.5%    NSTEMI (non-ST elevated myocardial infarction) (HCC)    Combined systolic and diastolic congestive heart failure ([facility]  Resolved Problems:    * No resolved hospital problems. *            ---- ;     [facility] [address] [place] [address].    Phone [phone]   Fax: [phone]  Answering Service: [phone]

## 2018-11-30 NOTE — PROGRESS NOTES
[address] [address] Continence Nurse  Consult Note       NAME:  Amparo Jolly RECORD NUMBER:  [id number]  AGE: 52 y.o. GENDER: male  : 1965  TODAY'S DATE:  2018    Subjective   Reason for WOCN Evaluation and Assessment: anterior bilateral ankle unstageable pressure injuries r/t patient's foot wear, ST 3 pressure injury to the right heel, BLE calf/pre-tibial eschar covered wounds of unclear origin. Psoriatic lesion left pre-tibial. Dry fungal like infection bilateral feet and toes.        Nicki Almanza is a 52 y.o. male referred by:   [x] Physician  [] Nursing  [] Other:     Wound Identification:  Wound Type: pressure, non-healing/non-surgical and undetermined  Contributing Factors: edema, venous stasis, diabetes, poor glucose control, chronic pressure, decreased mobility, shear force, obesity, decreased tissue oxygenation, incontinence of stool, incontinence of urine, poor hygiene and non-adherence      Patient Goal of Care:  [] Wound Healing  [] Odor Control  [] Palliative Care  [] Pain Control   [] Other:         PAST MEDICAL HISTORY        Diagnosis Date    Chronic back pain     Diet-controlled type 2 diabetes mellitus ([facility] 2011 Oct     GERD (gastroesophageal reflux disease)     Hypertension     Hypertriglyceridemia     Hypothyroidism     IDDM (insulin dependent diabetes mellitus) ([facility]     Osteoarthritis     Psoriasis     Type II or unspecified type diabetes mellitus without mention of complication, not stated as uncontrolled     Unspecified sleep apnea        PAST SURGICAL HISTORY    Past Surgical History:   Procedure Laterality Date    PALATE SURGERY         FAMILY HISTORY    Family History   Problem Relation Age of Onset    Diabetes Mother     Hypertension Mother     Diabetes Father     Diabetes Brother     Hypertension Brother        SOCIAL HISTORY    Social History   Substance Use Topics    Smoking status: Never Smoker    Smokeless tobacco: Never Used    (gastroesophageal reflux disease) K21.9    Psoriasis L40.9    Hyperlipidemia E78.5    Osteoarthritis M19.90    Chronic back pain M54.9, G89.29    Sleep apnea G47.30    Rectal bleeding K62.5    Cerebral AV malformation Q28.2    Cervical stenosis of spinal canal M48.02    Substance use disorder F19.90    Status epilepticus ([address] G40.901    AVM (arteriovenous malformation) Q27.30    New onset seizure ([address] R56.9    Diabetes type 2, uncontrolled ([address] E11.65    Shoulder pain, right M25.511    Noncompliance with medication regimen Z91.14    Hyperglycemia R73.9    ASCVD (arteriosclerotic cardiovascular disease) 10-year cardiovascular risk 24.5% I25.10    NSTEMI (non-ST elevated myocardial infarction) (HCC) I21.4    Combined systolic and diastolic congestive heart failure (HCC) I50.40       Measurements:  Wound 11/29/18 Ankle Right; Anterior (Active)   Wound Unstageable 11/30/2018 12:06 PM   Dressing Status Changed 11/29/2018  1:57 PM   Dressing Changed Changed/New 11/30/2018 12:06 PM   Dressing/Treatment Wound gel;Tegaderm 11/30/2018  8:00 AM   Wound Cleansed Rinsed/Irrigated with saline 11/30/2018 12:06 PM   Dressing Change Due 12/03/18 11/30/2018 12:06 PM   Wound Length (cm) 2 cm 11/29/2018  1:57 PM   Wound Width (cm) 2.5 cm 11/29/2018  1:57 PM   Wound Surface Area (cm^2) 5 cm^2 11/29/2018  1:57 PM   Wound Assessment Dry;Brown 11/30/2018 12:06 PM   Drainage Amount None 11/30/2018 12:06 PM   Odor None 11/30/2018  8:00 AM   Devorah-wound Assessment Clean;Dry; Intact;Edema 11/30/2018  8:00 AM   Black%Wound Bed 100 11/29/2018  1:57 PM   Number of days: 0       Wound 11/29/18 Pretibial Right (Active)   Dressing Changed Changed/New 11/30/2018 12:06 PM   Dressing/Treatment Wound gel;Tegaderm 11/30/2018  8:00 AM   Wound Cleansed Rinsed/Irrigated with saline 11/30/2018 12:06 PM   Dressing Change Due 12/03/18 11/30/2018 12:06 PM   Wound Length (cm) 2.5 cm 11/29/2018  1:57 PM   Wound Width (cm) 2 cm 11/29/2018  1:57

## 2018-11-30 NOTE — PROGRESS NOTES
fractures. .     No evidence for acute cardiopulmonary pathology. Ct Head Wo Contrast    Result Date: 11/28/2018  EXAMINATION: CT OF THE HEAD WITHOUT CONTRAST  11/28/2018 1:19 pm TECHNIQUE: CT of the head was performed without the administration of intravenous contrast. Dose modulation, iterative reconstruction, and/or weight based adjustment of the mA/kV was utilized to reduce the radiation dose to as low as reasonably achievable. COMPARISON: October 2, 2015 HISTORY: ORDERING SYSTEM PROVIDED HISTORY: fall TECHNOLOGIST PROVIDED HISTORY: FINDINGS: BRAIN/VENTRICLES: There is no acute intracranial hemorrhage, mass effect or midline shift. No abnormal extra-axial fluid collection. The gray-white differentiation is maintained. Dense left basal ganglia calcification is again demonstrated. Mild white matter changes in the periventricular region are noted. There is no evidence of hydrocephalus. ORBITS: Soft tissue enlargement within the superior left orbit is noted with round calcifications. This appears unchanged. No acute soft tissue abnormality identified within the orbits. SINUSES: The visualized paranasal sinuses and mastoid air cells demonstrate no acute abnormality. SOFT TISSUES/SKULL:  Left supraorbital soft tissue swelling is noted. No underlying fracture identified. No acute intracranial abnormality. Left supraorbital soft tissue swelling. Redemonstration of calcified vascular abnormality in the left basal ganglia and left orbit, as seen previously. Ct Cervical Spine Wo Contrast    Result Date: 11/28/2018  EXAMINATION: CT OF THE CERVICAL SPINE WITHOUT CONTRAST 11/28/2018 1:19 pm TECHNIQUE: CT of the cervical spine was performed without the administration of intravenous contrast. Multiplanar reformatted images are provided for review.  Dose modulation, iterative reconstruction, and/or weight based adjustment of the mA/kV was utilized to reduce the radiation dose to as low as reasonably achievable. COMPARISON: MRI 09/28/2015 HISTORY: ORDERING SYSTEM PROVIDED HISTORY: fall FINDINGS: BONES/ALIGNMENT: There is no evidence of an acute cervical spine fracture. There is normal alignment of the cervical spine. DEGENERATIVE CHANGES: C5-C6 moderate disc space narrowing with endplate sclerosis and prominent anterior osteophyte posterior disc osteophyte complex also evident on prior MRI. SOFT TISSUES: There is no prevertebral soft tissue swelling. No acute abnormality of the cervical spine. Degenerative changes at C5-C6. Assessment and Plan:   Principal Problem:    Hyperglycemia  Active Problems:    Hypothyroidism    Hyperlipidemia    Diabetes type 2, uncontrolled ([facility])    ASCVD (arteriosclerotic cardiovascular disease) 10-year cardiovascular risk 24.5%    NSTEMI (non-ST elevated myocardial infarction) ([facility])    Combined systolic and diastolic congestive heart failure ([address]  Resolved Problems:    * No resolved hospital problems. *    1. Diabetes mellitus type 2  hba1c 14  Off insulin drip  Continue lantus 55 units nightly  Low dose SS insulin  lisinopil 5mg  poct glucose 106  Lactic acidosis resolved on hydration     2. NSTEMI  Elevated troponin  Trending up from all 0.13 to 0.15  On heparin drip    3. Chronic combined congestive heart failure - stable  Echo done show systolic and diastolic heart failure  Stress test today    4. Hypothyroidism  On synthytroid 137  TSH with reflex    5. Tinea pedis  Fluconazole 150mg weekly    Potassium replacement per protocol  Stress test today    DR Brien Oneal MD  INTERNAL MEDICINE RESIDENT, PGY-1  [address] [place], [place]  11/30/2018,10:21 AM   Attending Physician Statement  I have discussed the care of Martinez Young, including pertinent history and exam findings,  with the resident. I have seen and examined the patient and the key elements of all parts of the encounter have been performed by me.   I agree with the assessment, plan and

## 2018-12-01 NOTE — PROGRESS NOTES
Recommendations: Balance Training, Functional Mobility Training, Endurance Training, Self-Care / ADL, Safety Education & Training, Equipment Evaluation, Education, & procurement, Patient/Caregiver Education & Training    G-Code  OT G-codes  Functional Assessment Tool Used: Minesh-Gregory  Score: 20  Functional Limitation: Self care  Self Care Current Status (): At least 20 percent but less than 40 percent impaired, limited or restricted  Self Care Goal Status (): At least 1 percent but less than 20 percent impaired, limited or restricted    AM-PAC Score      How much help from another person does the pt currently need? Unable A Lot A Little None   1. Putting on and taking off regular lower body clothing? 1      2      3       4   2. Bathing (including washing, rinsing, drying)? 1      2      3      4   3. Toileting, which includes using toilet, bedpan, or urinal?      1      2        3      4   4. Putting on and taking off regular upper body clothing? 1      2      3       4   5. Taking care of personal grooming such as brushing teeth? 1      2      3      4   6. Eating meals? 1      2       3       4     1. Unable = Total/Dependent Assist  2. A Lot = Maximum/Moderate Assist  3. A Little = Minimum/Contact Guard Assist/Supervision  4. None= Modified [place]/Independent    Raw Score Scale Score Scale Score Standard Error Approximate Degree of Functional Impairment     6 17.07 3.74 100%   7 20.13 3.68 92%   8 22.86 3.43 86%   9 25.33 3.17 80%   10 27.31 2.96 75%   11 29.04 2.79 70%   12 30.60 2.68 67%   13 32.03 2.62 63%   14 33.39 2.61 60%   15 34.69 2.65 56%   16 35.96 2.71 53%   17 37.26 2.82 50%   18 38.66 2.97 47%   19 40.22 3.20 43%   20 42.03 3.55 38%   21 44.27 4.08 33%   22 47.10 4.81 26%   23 51.12 5.88 16%   24 57.54 7.36 0%       Goals  Short term goals  Time Frame for Short term goals: By discharge pt will. Nuris Franklin term goal 1: demo independent bed

## 2018-12-01 NOTE — PLAN OF CARE
Problem: Falls - Risk of:  Goal: Will remain free from falls  Will remain free from falls   Outcome: Met This Shift  Fall risk precautions in place. Bed in lowest position with wheels locked, bed alarm in place and activated, non-skid socks on pt, fall risk id on pt, call light in reach, pt encouraged to call before getting out of bed and for any other needs. Patient assisted with bedpan, uses bedside urinal appropriately, and educated on fall prevention.

## 2018-12-01 NOTE — CARE COORDINATION
Spoke with nurse. Brady. Patient did get his glucometer and medications and was anxious to leave. Patient pulled out his IV and on the verge of leaving AMA. Physician put a discharge order in and patient left, declining any skilled needs at this time.

## 2018-12-01 NOTE — PROGRESS NOTES
[address] [place] [address]     Department of Internal Medicine - Staff Internal Medicine Service     DAILY PROGRESS NOTE       Patient:  Mikaela Balderrama  YOB: 1965  MRN: [id number]     Acct: [de-identified]     Admit date: 11/28/2018  Admitting Diagnosis: Hyperglycemia    Subjective:   Patient seen and examined at bedside  Afebrile. Hemodynamically stable. Electrocardiographically negative Lexiscan stress study   Started Metformin today. HbA1c 14.1. Lantus 55 units nightly with ISS. Awaiting PT/OT evaluations for possible SNF  Patient is homeless and cannot care for medical needs.       Objective:   BP (!) 168/92   Pulse 94   Temp 97.6 °F (36.4 °C) (Oral)   Resp 18   Ht 5' 8\" (1.727 m)   Wt 193 lb 5.5 oz (87.7 kg)   SpO2 97%   BMI 29.40 kg/m²       General appearance - alert, bruises noted , poor hygiene  Mental status - alert, oriented to person, place, and time  Eyes -black eye left eye  Mouth - mucous membranes moist, pharynx normal without lesions  Chest - clear to auscultation, no wheezes, rales or rhonchi, symmetric air entry  Heart - normal rate, regular rhythm, normal S1, S2, no murmurs, rubs, clicks or gallops  Abdomen - soft, nontender, nondistended, no masses or organomegaly  Neurological - alert, oriented, normal speech, no focal findings or movement disorder noted  Musculoskeletal- edema 2+, lower limb show venous ulcers with lesions of psoriasis, fungal infection between toes and under nails  Extremities - peripheral pulses normal, no pedal edema, no clubbing or cyanosis  Skin - normal coloration and turgor, no rashes, no suspicious skin lesions noted      Intake/Output Summary (Last 24 hours) at 12/01/18 1250  Last data filed at 12/01/18 0450   Gross per 24 hour   Intake             1440 ml   Output             1825 ml   Net             -385 ml         Medications:      metFORMIN  1,000 mg Oral BID WC    fluticasone  1 spray Each Nare Daily    lisinopril  5 mg Oral

## 2018-12-05 NOTE — TELEPHONE ENCOUNTER
[facility] Transitions Initial Follow Up Call    Outreach made within 2 business days of discharge: Yes    Patient: Nicki Almanza Patient : 1965   MRN: [id number]  Reason for Admission: There are no discharge diagnoses documented for the most recent discharge. Discharge Date: 18       Spoke with: nehal    Discharge department/facility:  [facility]    Follow Up  No future appointments.     Leonarda Patiño

## 2018-12-16 PROBLEM — R79.89 ELEVATED TSH: Status: ACTIVE | Noted: 2018-01-01

## 2018-12-16 PROBLEM — E11.65 HYPERGLYCEMIA DUE TO TYPE 2 DIABETES MELLITUS (HCC): Status: ACTIVE | Noted: 2018-01-01

## 2018-12-16 NOTE — ED PROVIDER NOTES
[facility]  Emergency Department Encounter  EmergencyMedicine Resident     Pt Ryan Gibbs  MRN: [id number]  [place] 1965  Date of evaluation: 12/16/18  PCP:  ENEDELIA Reagan [address] [facility]       Chief Complaint   Patient presents with    Incontinence     Pt has been incontinent of stool and urine since last admission, arrived to soiled underwear and pants        HISTORY OF PRESENT ILLNESS  (Location/Symptom, Timing/Onset, Context/Setting, Quality, Duration, Modifying Factors, Severity.)      Nicki Almanza is a 52 y.o. male who presents with complaint of voiding urine and stool due to inability to get to a bathroom quickly enough. Patient states he lives in a car. He was recently Discharged from the hospital on 12/1/18. Patient states that he's been taking his medications as prescribed. Other than the above mentioned complaint, patient denies any fever, chills, nausea, vomiting, chest pain or belly pain. Denies any constitutional complaints. Denies any weakness, numbness or tingling to his upper or lower extremities. PAST MEDICAL / SURGICAL / SOCIAL / FAMILY HISTORY      has a past medical history of Chronic back pain; Diet-controlled type 2 diabetes mellitus ([facility] GERD (gastroesophageal reflux disease); Hypertension; Hypertriglyceridemia; Hypothyroidism; IDDM (insulin dependent diabetes mellitus) [facility] Osteoarthritis; Psoriasis; Type II or unspecified type diabetes mellitus without mention of complication, not stated as uncontrolled; and Unspecified sleep apnea. has a past surgical history that includes Palate surgery (2012). Social History     Social History    Marital status: Single     Spouse name: N/A    Number of children: N/A    Years of education: N/A     Occupational History    Not on file.      Social History Main Topics    Smoking status: Never Smoker    Smokeless tobacco: Never Used    Alcohol use No    Drug use: Yes     Types: Marijuana    Sexual activity: Yes     Partners: Female     Other Topics Concern    Not on file     Social History Narrative    No narrative on file       Family History   Problem Relation Age of Onset    Diabetes Mother     Hypertension Mother    [facility] Diabetes Father     Diabetes Brother     Hypertension Brother        Allergies:  Pcn [penicillins]    Home Medications:  Prior to Admission medications    Medication Sig Start Date End Date Taking? Authorizing Provider   fluconazole (DIFLUCAN) 150 MG tablet Take 1 tablet by mouth once a week 12/7/18   Betty Cr MD   atorvastatin (LIPITOR) 80 MG tablet Take 1 tablet by mouth nightly 12/1/18   Betty Cr MD   lisinopril (PRINIVIL;ZESTRIL) 5 MG tablet Take 1 tablet by mouth daily 12/1/18   Betty Cr MD   metoprolol tartrate (LOPRESSOR) 25 MG tablet Take 0.5 tablets by mouth 2 times daily 12/1/18   Betty Cr MD   metFORMIN (GLUCOPHAGE) 1000 MG tablet Take 1 tablet by mouth 2 times daily (with meals) 12/1/18   Betty Cr MD   levothyroxine (SYNTHROID) 137 MCG tablet Take 1 tablet by mouth daily 12/2/18   Betty Cr MD   Insulin Pen Needle 31G X 6 MM MISC 1 each by Does not apply route daily 12/1/18   Betty Cr MD   Lancets MISC 1 each by Does not apply route 3 times daily 12/1/18   Betty Cr MD   insulin glargine (LANTUS SOLOSTAR) 100 UNIT/ML injection pen Inject 55 Units into the skin nightly 12/1/18   MD Marilu Mccarthy MISC 1 box by Does not apply route as needed (glucose monitoring) 12/1/18   Betty Cr MD   glucose blood VI test strips (ASCENSIA AUTODISC VI;ONE TOUCH ULTRA TEST VI) strip Use with associated glucose meter. 10/6/15   Tonya Pina, APRN - CNP       REVIEW OF SYSTEMS    (2-9 systems for level 4, 10 or more for level 5)      Review of Systems   Constitutional: Negative for chills and fever. HENT: Negative for rhinorrhea and sore throat. Eyes: Negative for pain and visual disturbance. 156 ms    QRS Duration 76 ms    Q-T Interval 370 ms    QTc Calculation (Bazett) 402 ms    P Axis 65 degrees    R Axis 39 degrees    T Axis 28 degrees   EKG 12 lead   Result Value Ref Range    Ventricular Rate 68 BPM    Atrial Rate 68 BPM    P-R Interval 156 ms    QRS Duration 82 ms    Q-T Interval 408 ms    QTc Calculation (Bazett) 433 ms    P Axis 56 degrees    R Axis 79 degrees    T Axis 44 degrees       IMPRESSION: Patient presents with complaints of urinary and stool voiding due to inability to get to bathroom in time. Patient is afebrile and hemodynamically stable. Denies any other constitutional complaints. Given his presentation, comorbid AIDS, plan is for her basic labs. Anticipate admission for social evaluation. RADIOLOGY:  None    EKG  None    All EKG's are interpreted by the Emergency Department Physician who either signs or Co-signs this chart in the absence of a cardiologist.    EMERGENCY DEPARTMENT COURSE:  Patient updated on lab results. Discussed that given his presentation and abnormal lab values, we like to have him admitted for further evaluation and management. Patient is agreeable to plan. He is stable, awaiting transfer to the floor. PROCEDURES:  None    CONSULTS:  IP CONSULT TO INTERNAL MEDICINE  IP CONSULT TO SOCIAL WORK  IP CONSULT TO SOCIAL WORK  IP CONSULT TO SOCIAL WORK  IP CONSULT TO DIABETES EDUCATOR    CRITICAL CARE:  None    FINAL IMPRESSION      1.  Elevated TSH          DISPOSITION / PLAN     DISPOSITION Admitted 12/16/2018 01:48:26 PM      PATIENT REFERRED TO:  ENEDELIA Soliman - CNP  [address] [id number]            DISCHARGE MEDICATIONS:  Current Discharge Medication List          Jai Hunter MD  Emergency Medicine Resident    (Please note that portions of thisnote were completed with a voice recognition program.  Efforts were made to edit the dictations but occasionally words are mis-transcribed.)       Rome Frank MD  Resident  12/16/18 [id number]

## 2018-12-16 NOTE — H&P
[facility]     Department of Internal Medicine - Staff Internal Medicine Service          ADMISSION NOTE/HISTORY AND PHYSICAL EXAMINATION   ______________________________________________________________________    HISTORY OBTAIN FROM:  patient    CHIEF COMPLAINT:  Diarrhea and increased urination    HISTORY OF PRESENT ILLNESS:      PMH: NSTEMI, Combined CHF, DM type II, Hypothyroidism, HLD    The patient is a pleasant 52 y.o. male with significant past medical history as above presents with a chief complaint of increased urination without dysuria as well as increased bowel movements. Patient states over the last week or 2 he has had increased urination but denies dysuria or foul smell. Patient also notes that he's had 3-5 watery bowel movements per day over the last week. Patient denies any infectious systemic signs such as fevers, chills, headaches, neck stiffness, abdominal pain, cough, or sputum production. Patient also denies chest pains, shortness of breath, numbness/tingling. Denies hematochezia, melena, BRB per rectum. Of note patient was recently discharged from [address] [place] [address] on 12/1/18. Patient was treated for hyperglycemia and had not been taking his insulin for the last 3 weeks prior to admission. During this admission patient's troponins were slightly elevated at 0.13-0.15 and heparin was started. There is no EKG changes at that time. Cardiology was consulted and a stress test was obtained which was negative. No further cardiac evaluation or intervention. Patient was discharged with Lantus 55 units nightly, lisinopril 5 mg, Lipitor 80 mg, Lopressor 25 mg twice a day as well as metformin thousand milligrams twice a day. Patient has some social issues including living in his car with his brother and last admission refused to go to a facility. Patient states he has been taking his medications however a pill bottle, does not support this.     In Diarrhea  -Likely from Hypothyroidism  -Follow up C. Diff, Stool culture, Giardia, Cryptosporidium, and lactoferrin  -C. Diff precautions.   -Hepatic negative with no bilirubinemia. Elevated ALP  -  -Follow up GGT    Troponemia  -0.13-0.16 last admission  -Negative stress test 11/30/18. Combined CHF systolic/diastolic  -Vitals normal  -Echo 11/2018: EF 45-50% with anterior and inferior wall hypokinesis. Evidence of diastolic dysfunction. RVSP 25 mmHG. -Stress test 11/30/18: Negative electrocardiographically lexiscan.  -Trend Trops x 3.   -Monitor respiratory status during hydration  -Follow up EKG  -Home medications Lopressor 12.5 BID and Lisinopril 5 mg daily   -Labetalol PRN    HTN  -Home medications Lopressor 12.5 BID and Lisinopril 5 mg daily   -Labetalol PRN  -Monitor vitals and telemetry     HLD  -Lipitor 80 mg     Fungal infection buttocks  -Lotrim cream  -Wound care. DVT ppx: Lovenox  GI ppx    PT/OT/SW  Social work: Consulted for inability to care for himself. Lives in his car with his brother. Maura Bahena MD  Internal Medicine Resident, PGY-1  [facility]; [place], [place]  12/16/2018, 4:38 PM      Attending Physician Statement  I have discussed the care of Kaila Campos, including pertinent history and exam findings with the resident. I have reviewed the key elements of all parts of the encounter with the resident. I have seen and examined the patient with the resident. I agree with the assessment and plan and status of the problem list as documented. I've seen the patient during my round this morning, have reviewed the labs, previous discharge summary seen and I reviewed the previous echo stress test results, cardiology note seen.   He was admitted with complaints of diarrhea but since he is admitted he does not have any diarrhea he had no recent use of antibiotic but he does have recent admission and will need to do this 20 studies including cultures

## 2018-12-16 NOTE — ED PROVIDER NOTES
other components within normal limits   URINE RT REFLEX TO CULTURE       Radiology  No orders to display         EKG:  EKG Interpretation    Interpreted by me    Rhythm: normal sinus   Rate: normal  Axis: normal  Ectopy: none  Conduction: normal  ST Segments: no acute change  T Waves: T wave flattening diffusely  Q Waves: Anteroseptal  Poor R-wave progression anteriorly    Clinical Impression: Old anterior MI    Attending Physician Additional  Notes    She lives in a car with his brother. He's been having frequent diarrhea, urinary urgency. Unable to get to the bathroom quick enough and voids. He's got increased swelling in both legs as well as around his face. He fell recently and has periorbital bruising but imaging was negative. No chest pain terms of breath. No abdominal pain. No vomiting. On exam he is ill-appearing, tachycardic, hypertensive, afebrile. Abdomen is soft but protuberant. He is pale. His 3+ pitting edema in both lower extremity is present is multiple excoriations and weeping lesions in both lower extremities with surrounding cellulitis. There is no Tenderness. He also has periorbital edema. Mouth is dry. Lungs are clear. No JVD. Impression is peripheral edema, rule out nephrotic syndrome renal insufficiency low albumin state; diarrheal illness with dehydration; urinary urgency and urge incontinence. Plan is labs antibiotics admission. Nusrat Zaldivar.  Agatha Nick MD, [facility]  Attending Emergency  Physician               Jason Arnold MD  12/16/18 3603

## 2018-12-17 NOTE — PROGRESS NOTES
[facility] Wound Ostomy  Nurse  Consult Note       NAME:  Amparo Jolly RECORD NUMBER:  [id number]  AGE: 52 y.o. GENDER: male  : 1965  TODAY'S DATE:  2018    Subjective   Reason for [address] Nurse Evaluation and Assessment:   Multiple scattered wounds to lower legs. Pressure injuries from footwear remain to both anterior ankles; were unstagable on last admission. Vigorously cleansed to remove tan fibrinous exudate to reveal red wound bed. Left lower leg with psoriasis-like plaques of dry, flaky, loosely adherent crust.  Hygiene provided. Multiple areas of red erythematous rash taking the shape of what appears to be dressings. Measurements:     18 1600   Wound 18 Ankle Right; Anterior   Date First Assessed/Time First Assessed: 18 1356   Present on Hospital Admission: Yes  Wound Approximate Age at First Assessment (Weeks): 3 weeks  Primary Wound Type: Pressure Injury  Location: Ankle  Wound Location Orientation: Right; Anterior   Wound Image    Wound Pressure Stage  3   Dressing Changed Changed/New   Dressing/Treatment Wound gel;Dry dressing; Ace Wrap   Wound Length (cm) 1.5 cm   Wound Width (cm) 2 cm   Wound Depth (cm) 0.3 cm   Wound Surface Area (cm^2) 3 cm^2   Change in Wound Size % (l*w) 40   Wound Volume (cm^3) 0.9 cm^3   Wound Assessment Pink;Yellow;Drainage   Drainage Amount Moderate   Drainage Description Serous   Devorah-wound Assessment Red;Non-blanchable erythema; White   Wound 18 Leg Right; Lower;Medial cluster of wounds   Date First Assessed/Time First Assessed: 18 1357   Present on Hospital Admission: Yes  Wound Approximate Age at First Assessment (Weeks): 3 weeks  Location: Leg  Wound Location Orientation: Right; Lower;Medial  Wound Description (Comments): clust...    Wound Image    Dressing/Treatment Alginate with Ag   Wound Cleansed Rinsed/Irrigated with saline   Dressing Change Due 18   Wound Length (cm) 7 cm   Wound Width (cm) 3 cm   Wound

## 2018-12-17 NOTE — PROGRESS NOTES
[facility]  Internal Medicine Residency Program  Inpatient Daily Progress Note  ______________________________________________________________________________    Patient: Mikaela Balderrama  YOB: 1965   KARAN:[id number]    Acct: [de-identified]     Admit date: 12/16/2018  Today's date: 12/16/18  Number of days in the hospital: 0       Admitting Diagnosis: Hyperglycemia due to type 2 diabetes mellitus ([facility]    Subjective:   Pt examined at bedside. Chart &results reviewed. Afebrile. Hemodynamically stable.  this am.     Trops flattened. Iv synthroid for now. Increase Lantus to 55 units nightly. High dose sliding scale. Morning glucose 324. Replace potassium this morning. No BM over last night. Will continue to monitor. Review of Systems    Objective:   Vital Sign:  BP (!) 143/97   Pulse 88   Temp 98 °F (36.7 °C) (Oral)   Resp 18   Ht 5' 8\" (1.727 m)   Wt 180 lb (81.6 kg)   SpO2 98%   BMI 27.37 kg/m²       In: 204   Out: -     Physical Exam:  CONSTITUTIONAL:  awake, alert, cooperative, no apparent distress, and appears stated age  LUNGS:  No increased work of breathing, good air exchange, clear to auscultation bilaterally, no crackles or wheezing  CARDIOVASCULAR:  Normal apical impulse, regular rate and rhythm, normal S1 and S2, no S3 or S4, and no murmur noted  ABDOMEN:  No scars, normal bowel sounds, soft, non-distended, non-tender, no masses palpated, no hepatosplenomegally  MUSCULOSKELETAL:  There is no redness, warmth, or swelling of the joints. Full range of motion noted. Motor strength is 5 out of 5 all extremities bilaterally. Tone is normal.  NEUROLOGIC:  Awake, alert, oriented to name, place and time. Cranial nerves II-XII are grossly intact. Motor is 5 out of 5 bilaterally. Skin: Tinea pedis. Multiple abrasions on the bilateral lower extremity. Pressure stage I ulceration on the sacral area.  Erythema and redness of the    HLD  -Lipitor 80 mg     Anemia-Normocytic  -Hgb 12.8. Stable. -Iron 43. Saturation low. TIBC 227. B12/Folate normal   -IV venofer x 2     Fungal infection buttocks  -Lotrim cream  -Wound care.      DVT ppx: Lovenox  GI ppx     PT/OT/SW  Social work: Consulted for inability to care for himself. Lives in his car with his brother. Beatriz Pearl MD  Internal MedicineResident, PGY-1  [address], [place]  12/16/2018, 10:38 PM    Attending Physician Statement  I have discussed the care of Rachel Luevano, including pertinent history and exam findings with the resident. I have reviewed the key elements of all parts of the encounter with the resident. I have seen and examined the patient with the resident. I agree with the assessment and plan and status of the problem list as documented.     See H&P attested today    Anna Lorenzo MD  12/17/2018 5:25 PM

## 2018-12-17 NOTE — PROGRESS NOTES
Smoking Cessation - topics covered   []  Health Risks  []  Benefits of Quitting   []  Smoking Cessation  [x]  Patient has no history of tobacco use  []  Patient is former smoker. [x]  No need for tobacco cessation education. []  Booklet given  []  Patient verbalizes understanding. []  Patient denies need for tobacco cessation education.   Vesna Shepard  8:53 AM

## 2018-12-17 NOTE — PLAN OF CARE
Problem: Nutrition  Goal: Optimal nutrition therapy  Outcome: Ongoing  Nutrition Problem: Increased nutrient needs  Intervention: Food and/or Nutrient Delivery: Continue current diet, Start ONS  Nutritional Goals: Pt to consume >75% of meals/supplements

## 2018-12-17 NOTE — PLAN OF CARE
Problem: Falls - Risk of:  Goal: Will remain free from falls  Will remain free from falls   Outcome: Ongoing  Following fall precautions. Calls out appropriately. Bed alarm on. Goal: Absence of physical injury  Absence of physical injury   Outcome: Ongoing      Problem: Risk for Impaired Skin Integrity  Goal: Tissue integrity - skin and mucous membranes  Structural intactness and normal physiological function of skin and  mucous membranes.    Outcome: Ongoing

## 2018-12-17 NOTE — CARE COORDINATION
Consult received d/t pt living in his car  Pt known to writer from previous admission  Met with pt and his brother, Hoda Greer  Both pt and Dominick live in their cars, which they keep parked out from of their ex-nephew's home on [address]  They have lived in their cars for ~ 3 yrs  They have 3 other brothers in town but state they cannot stay with them  They refuse to go to the shelters, d/t the bed bugs  Pt and his brother both receive an income (pt receives $701/month, brother $1032/month)  When they run out of money, they will go to local [employer] to eat  They do laundry at their friend's home or ex-nephew's home  They shower ~ 1x week at a friend's home  Pt does not have a cell phone, brother just filled out paperwork yest to get a phone thru [facility] and it is getting shipped to his brother's home    Last admission, provided pt with housing options (apts), meal sites/food pantries, made a ref to [facility]  Noted PT/OT ordered   Pt stated he has never been to a SNF - they report they have a brother at [facility]  Pt stated he just wants his legs healed up so he can leave  Dominick shared that pt has had 3 falls since last admission, pt has also been incontenent  He is not old enough for APS ref  CM to follow for discharge plans, hopefully pt can go to SNF at discharge

## 2018-12-18 NOTE — PROGRESS NOTES
Follow up Diabetes Education Visit - Seen as inpatient on 11/28/2018 also. Met with patient for education for T2DM. Brother who brought him to the hospital and lives out of his car was at bedside. This admission due to wounds on legs - non healing. Patient and Brother were attentive and explained that they have insulin, BG meter and medications in car. Stated keep insulin in a cooler in the trunk of car. Apparently both bother have own car and each stays in their respective car, but the cars are parked next to one another. They also related limited food supplies and eating hot lunch daily at soup kitchen on [address] [place] [address] in [place]. Lab Results   Component Value Date    LABA1C  11/28/2018     Hemoglobin variant present, sample sent to reference laboratory. LABA1C 14.1 (H) 11/28/2018    LABA1C 14.0 09/20/2017     Lab Results   Component Value Date    LABMICR 205 (H) 09/20/2017    CREATININE 0.77 12/18/2018       Following Survival Skills education topics were covered as appropriate to patient plan of for care:  __x_ Type 2 Diabetes diagnosis as chronic and progressive       _x__ Healthy eating - CHO food groups and need for CHO controlled diet. Elimination of sugary beverages, avoid skipping meal    _x__ Role of physical activity - bouts of walking, swimming or low impact aerobics as recommended by care providers- aim for 30 minutes per day      __x_ Blood Glucose Self-Monitoring ( BGSM)     _x__ Blood sugar targets Pre meal 80 - 130 and 2 hours post meal < 180    _x__ Hyperglycemia  ( BG over 250) signs and symptoms and treatment     _x__ Hypoglycemia( BG < than 70) signs and symptoms and treatment \"Rule of 15\"    _x__ Keep BG log and take log and meter to follow up HCP appointments    ___ Medications -  Insulin Lantus - Basal ( long acting) / Humalog-  Bolus (pre meal)  regime - insulin action times.    Prior dose was 55 units Lantus in the am and used the pen method   __x_ Importance of dosing  pre meal

## 2018-12-18 NOTE — CARE COORDINATION
Transitional planning-talked with patient-wanting [facility] as first choice. Referral called and faxed to [facility]. 2nd Choice-[facility]. Has [employer]. Faxed H&P, face sheet, and PT/OT notes to [employer].  [facility] is out of network with his insurance. Faxed face sheet and called [facility]-talked with Shruthi Bernstein.

## 2018-12-18 NOTE — PROGRESS NOTES
Assessment Dry;Red  (improved)   Wound 11/29/18 Leg Right; Lower;Medial cluster of wounds   Date First Assessed/Time First Assessed: 11/29/18 1357   Present on Hospital Admission: Yes  Wound Approximate Age at First Assessment (Weeks): 3 weeks  Location: Leg  Wound Location Orientation: Right; Lower;Medial  Wound Description (Comments): clust... Dressing Status Changed   Dressing Changed Changed/New   Dressing/Treatment Wound gel;Dry dressing; Ace Wrap   Wound Cleansed Rinsed/Irrigated with saline   Wound Assessment Pink;Yellow   Wound 11/29/18 Ankle Anterior; Left   Date First Assessed/Time First Assessed: 11/29/18 1359   Present on Hospital Admission: Yes  Wound Approximate Age at First Assessment (Weeks): 3 weeks  Primary Wound Type: Pressure Injury  Location: Ankle  Wound Location Orientation: Anterior; Left   Dressing Status Changed   Dressing Changed Changed/New   Dressing/Treatment Wound gel;Dry dressing; Ace Wrap   Wound Cleansed Rinsed/Irrigated with saline   Wound Assessment Pink;Yellow   Drainage Amount Small   Drainage Description Serosanguinous   Wound 11/29/18 Leg Left;Medial;Lower cluster of wounds   Date First Assessed/Time First Assessed: 11/29/18 1359   Present on Hospital Admission: Yes  Wound Approximate Age at First Assessment (Weeks): 3 weeks  Location: Leg  Wound Location Orientation: Left;Medial;Lower  Wound Description (Comments): cluste. .. Dressing Status Changed   Dressing Changed Changed/New   Dressing/Treatment Wound gel;Dry dressing; Ace Wrap   Wound Cleansed Rinsed/Irrigated with saline   Dressing Change Due 12/19/18   Wound Assessment Pink;Yellow   Wound 11/29/18 Heel Right   Date First Assessed/Time First Assessed: 11/29/18 1400   Present on Hospital Admission: Yes  Wound Approximate Age at First Assessment (Weeks): 3 weeks  Primary Wound Type: Pressure Injury  Location: Heel  Wound Location Orientation: Right   Dressing Changed Changed/New   Dressing/Treatment Wound gel;Dry

## 2018-12-19 NOTE — CARE COORDINATION
Transitional Planning  [facility]ed [facility] was told Parul Moreno from admissions was out of the office right now. Voicemail left for Parul Moreno  Await callback      12:35  COLT from [facility] made onsite visit. They have started precert. 1:35 Received phone call from [facility] asking if we referred   Pt for Snf informed [facility]. She states [facility] is not in network  Asked which facilities were in network and was instructed to go on the web to check  [facility] at [facility] and she said she was just informed they are oon. She states [employer] and [address]. Spoke with pt his first choice is [facility] and second choice is [place].   Referral sent to [facility]    14:35pm  Janett Jack from [facility]

## 2018-12-19 NOTE — PROGRESS NOTES
Individual Concurrent Group Co-treatment   Time In 1055         Time Out 1114         Minutes 19                 Almita Rizvi, PTA

## 2018-12-19 NOTE — PROGRESS NOTES
Occupational Therapy  Facility/Department: [facility] CAR 2  Daily Treatment Note  NAME: Jluis Hidalgo  : 1965  MRN: [id number]    Date of Service: 2018    Discharge Recommendations:  [address]       Patient Diagnosis(es): The primary encounter diagnosis was Elevated TSH. Diagnoses of Venous stasis dermatitis of both lower extremities and Hypothyroidism, unspecified type were also pertinent to this visit. has a past medical history of Chronic back pain; Diet-controlled type 2 diabetes mellitus ([facility] [address] GERD (gastroesophageal reflux disease); Hypertension; Hypertriglyceridemia; Hypothyroidism; IDDM (insulin dependent diabetes mellitus) ([facility] [address] Osteoarthritis; Psoriasis; Type II or unspecified type diabetes mellitus without mention of complication, not stated as uncontrolled; and Unspecified sleep apnea. has a past surgical history that includes Palate surgery (). Restrictions  Restrictions/Precautions  Restrictions/Precautions: General Precautions, Fall Risk, Contact Precautions, Up as Tolerated  Required Braces or Orthoses?: No  Position Activity Restriction  Other position/activity restrictions: Up with assist  Subjective   General  Chart Reviewed: No  Patient assessed for rehabilitation services?: Yes  Family / Caregiver Present: No  General Comment  Comments: RN cleared pt for therapy this date. Pt agreeable and cooperative throughout. Pt retired to chair at the end of the session with call light in reach/alarm activated.    Pain Assessment  Patient Currently in Pain: Denies     Objective    ADL  Feeding: Independent;Setup  Grooming: Minimal assistance;Setup (Seated in chair at tray table to wash and comb hair)  LE Dressing: Maximum assistance;Setup (w/footies)  Toileting: Stand by assistance (Using urinal at chair side)     Balance  Sitting Balance: Stand by assistance (Seated in recliner)     Assessment   Performance deficits / Impairments: Decreased functional mobility ;Decreased ADL status; Decreased safe awareness;Decreased endurance;Decreased balance  Assessment: Pt presents with limitations in functional transfers/mobility and ADL status. Pt to benefit from continued acute OT services while in the hospital. Pt homeless and living out of car with little support. Pt to benefit from therapy. Recommend d/c to SNF upon d/c from hospital.   Prognosis: Fair  Patient Education: OT POC, Discharge Recommendations. REQUIRES OT FOLLOW UP: Yes  Activity Tolerance  Activity Tolerance: Patient limited by fatigue  Safety Devices  Safety Devices in place: Yes  Type of devices: All fall risk precautions in place;Call light within reach; Chair alarm in place; Left in chair       Plan   Plan  Times per week: 3-4x/wk  Current Treatment Recommendations: Strengthening, Balance Training, Functional Mobility Training, Endurance Training, Safety Education & Training, Self-Care / ADL    Goals  Short term goals  Time Frame for Short term goals: By discharge patient will:  Short term goal 1: demo UB/LB ADL and grooming tasks SBA  Short term goal 2: demo functional transfers/functional mobility SBA using LRD  Short term goal 3: independently demo safety with functional transfers/functional mobility  Short term goal 4: demo static/dynamic standing tolerance 15+ minutes with SBA/LRD for increased participation in ADLs     Therapy Time   Individual Concurrent Group Co-treatment   Time In  1530         Time Out  1603           Minutes                   [address]/[place]

## 2018-12-19 NOTE — PROGRESS NOTES
[facility]  Internal Medicine Residency Program  Inpatient Daily Progress Note  ______________________________________________________________________________    Patient: Yani Baker  YOB: 1965   FAH:[id number]    Acct: [de-identified]     Admit date: 12/16/2018  Today's date: 12/19/18  Number of days in the hospital: 3       Admitting Diagnosis: Hyperglycemia due to type 2 diabetes mellitus ([facility] [address]    Subjective:   Pt examined at bedside. Chart &results reviewed. Afebrile. Hemodynamically stable.  this am.     Transitions to PO synthroid. Insulin regiment changed to 36 BID Lantus with ISS. Morning glucose 94. Increased Lopressor to 25 BID. Will increase Lisinopril to 20 mg.   BP improved    BMP and CBC WNL. UA 2+. Urine PCR 0.42  Replaced K     Patient agreeable to SNF. PT/OT recommend SNF on discharge. Per  note: [facility] first choice and referral sent. Await certification. Review of Systems    Objective:   Vital Sign:  BP (!) 140/93   Pulse 87   Temp 98.6 °F (37 °C) (Oral)   Resp 14   Ht 5' 8\" (1.727 m)   Wt 190 lb (86.2 kg)   SpO2 92%   BMI 28.89 kg/m²       In: 750   Out: 1800 [Urine:1800]    Physical Exam:  CONSTITUTIONAL:  awake, alert, cooperative, no apparent distress, and appears stated age  LUNGS:  No increased work of breathing, good air exchange, clear to auscultation bilaterally, no crackles or wheezing  CARDIOVASCULAR:  Normal apical impulse, regular rate and rhythm, normal S1 and S2, no S3 or S4, and no murmur noted  ABDOMEN:  No scars, normal bowel sounds, soft, non-distended, non-tender, no masses palpated, no hepatosplenomegally  MUSCULOSKELETAL:  There is no redness, warmth, or swelling of the joints. Full range of motion noted. Motor strength is 5 out of 5 all extremities bilaterally. Tone is normal.  NEUROLOGIC:  Awake, alert, oriented to name, place and time.   Cranial nerves II-XII are

## 2018-12-20 NOTE — PROGRESS NOTES
Resting perfusion abnormalities in 5%-9.9% of the myocardium in patients without a history or prior evidence of MI 3. Stress-induced perfusion abnormality encumbering 5%-9.9% of the myocardium or stress segmental scores indicating 1 vascular territory with abnormalities but without LV dilation 4. Small wall motion abnormality involving 1-2 segments and only 1 coronary bed. Low Risk (Less than 1% annual death or MI) 1. Normal or small myocardial perfusion defect at rest or with stress encumbering less than 5% of the myocardium. Assessment and Plan:   Hyperglycemia- History of DM type II-uncontrolled  -HbA1c 14.1 on discharge   -Continue IV hydration at 100 mL/hr overnight.   -Increased insulin regiment to 35 Lantus BID with ISS  -Hypoglycemia checks and orders per protocol   -DM educator  -UA 2+ proteins: Urine PCR 0.42  -Carb control diet.      Hypothyroidism - Hashimoto's thyroiditis   -TSH/ T4 free 11/30/18: 78.15/ 0.15   -TSH/ T4 free 12/16/18: 67.69/0.43.   -Elevated Thyroid Peroxidase (355.0)  & Thyroglobulin Ab (1653.0)  -Follow up Thyroglobulin stimulating receptor Ab   -Transitioned to home medication 137 mcg PO.  -Monitor vitals and mentation.      Diarrhea- no diarrhea since admission.   -Likely from Hypothyroidism.  -No diarrhea since admission. D/c c. Diff  -Follow up C. Diff, Stool culture, Giardia, Cryptosporidium, and lactoferrin- needs to be collected. -Hepatic negative with no bilirubinemia.      Elevated ALP  -/  -RUQ US: Partially contracted GB with likely related wall thickening to 4 mm. No edema/pericholecystic fluid. CBD 3.7 mm     Troponemia  -0.13-0.16 last admission  -Trop 0.12->0.11->0.11  -Negative stress test 11/30/18.   -No further ischemic workup per cardiology last admission.      Combined CHF systolic/diastolic  -Vitals normal  -Echo 11/2018: EF 45-50% with anterior and inferior wall hypokinesis. Evidence of diastolic dysfunction. RVSP 25 mmHG.   -Stress test

## 2018-12-20 NOTE — DISCHARGE SUMMARY
mg twice a day. This admission patient was agreeable to go to acute rehab facility as last admission he refused to go to any sort of care facility. Patient currently lives in his car with his brother and was unable to take care of himself. Patient was discharged on appropriate medications. Patient to follow-up with PCP and BMP in 1 week. Medications changed on discharge:  Lisinopril 40 mg daily   Lopressor 25 BID  Lantus 30 units BID    Procedures:  None    Any Hospital Acquired Infections: none    Discharge Functional Status:  stable    Disposition: Rehab    Patient Instructions:   Current Discharge Medication List      START taking these medications    Details   clotrimazole (LOTRIMIN) 1 % cream Apply topically 2 times daily.   Qty: 1 Tube, Refills: 1         CONTINUE these medications which have CHANGED    Details   lisinopril (PRINIVIL;ZESTRIL) 40 MG tablet Take 1 tablet by mouth daily  Qty: 30 tablet, Refills: 0      insulin glargine (LANTUS SOLOSTAR) 100 UNIT/ML injection pen Inject 30 Units into the skin 2 times daily  Qty: 5 pen, Refills: 3      metoprolol tartrate (LOPRESSOR) 25 MG tablet Take 1 tablet by mouth 2 times daily  Qty: 60 tablet, Refills: 3         CONTINUE these medications which have NOT CHANGED    Details   fluconazole (DIFLUCAN) 150 MG tablet Take 1 tablet by mouth once a week  Qty: 4 tablet, Refills: 0      atorvastatin (LIPITOR) 80 MG tablet Take 1 tablet by mouth nightly  Qty: 30 tablet, Refills: 3      metFORMIN (GLUCOPHAGE) 1000 MG tablet Take 1 tablet by mouth 2 times daily (with meals)  Qty: 60 tablet, Refills: 0      levothyroxine (SYNTHROID) 137 MCG tablet Take 1 tablet by mouth daily  Qty: 30 tablet, Refills: 0      Insulin Pen Needle 31G X 6 MM MISC 1 each by Does not apply route daily  Qty: 100 each, Refills: 3      Lancets MISC 1 each by Does not apply route 3 times daily  Qty: 600 each, Refills: 1      Cotton Balls MISC 1 box by Does not apply route as needed (glucose monitoring)  Qty: 1 each, Refills: 0      glucose blood VI test strips (ASCENSIA AUTODISC VI;ONE TOUCH ULTRA TEST VI) strip Use with associated glucose meter.   Qty: 100 strip, Refills: 11             Activity: activity as tolerated    Diet: diabetic diet    Follow-up:    ENEDELIA Chino CNP  [facility] [employer]  [phone]    Schedule an appointment as soon as possible for a visit      Follow up labs: BMP in 1 week    Follow up imaging: None    Note that over 30 minutes was spent in preparing discharge papers, discussing discharge with patient, medication review, etc.      Martha Figueroa MD         Department of Internal Medicine  [facility]         12/20/2018, 10:44 AM

## 2018-12-22 NOTE — ED PROVIDER NOTES
[address] [facility]  Emergency Department  Faculty Attestation     I performed a history and physical examination of the patient and discussed management with the resident. I reviewed the residents note and agree with the documented findings and plan of care. Any areas of disagreement are noted on the chart. I was personally present for the key portions of any procedures. I have documented in the chart those procedures where I was not present during the key portions. I have reviewed the emergency nurses triage note. I agree with the chief complaint, past medical history, past surgical history, allergies, medications, social and family history as documented unless otherwise noted below. For Physician Assistant/ Nurse Practitioner cases/documentation I have personally evaluated this patient and have completed at least one if not all key elements of the E/M (history, physical exam, and MDM). Additional findings are as noted. Primary Care Physician:  ENEDELIA Albert - CNP    Screenings:  [unfilled]    CHIEF COMPLAINT       Chief Complaint   Patient presents with    Wound Check     BLE diabetic ulcers       RECENT VITALS:   Temp: 98.1 °F (36.7 °C),  Pulse: 89, Resp: 15, BP: (!) 156/96    LABS:  Labs Reviewed - No data to display    Radiology  No orders to display         Attending Physician Additional  Notes    Patient is homeless, living in a car with his brother, came in for admission a few days ago for physical therapy, social work consultation, wound care, antibiotics, dressing changes, discharged to [facility]. He left the ECF saying he did not like the care he was getting. He returns today wanting help. He would like to be admitted for therapy dressing changes and to ECF placement. On exam he appears comfortable hypertensive afebrile nontoxic. There is improvement in his leg edema from last visit. There is improvement in the wounds which are now dry and not weeping. There is no evidence of cellulitis. Plan is social work consultation, consider Bactroban for the wounds, dressing changes. Nusrat Zaldivar.  Agatha Nick MD, [address] [facility]  Attending Emergency  Physician                Jason Arnold MD  12/21/18 2926

## 2018-12-22 NOTE — ED PROVIDER NOTES
distention, abdominal pain, constipation, diarrhea, nausea and vomiting. Genitourinary: Negative for difficulty urinating, dysuria, flank pain and frequency. Musculoskeletal: Negative for arthralgias, back pain, gait problem and myalgias. Skin: Positive for wound. Negative for rash. Neurological: Negative for dizziness, weakness, numbness and headaches. Hematological: Negative for adenopathy. Psychiatric/Behavioral: Negative for agitation and confusion. Except as noted above the remainder of the review of systems was reviewed and negative. PAST MEDICAL HISTORY     Past Medical History:   Diagnosis Date    Chronic back pain     Diet-controlled type 2 diabetes mellitus ([facility] [address] 2011 Oct     GERD (gastroesophageal reflux disease)     Hypertension     Hypertriglyceridemia     Hypothyroidism     IDDM (insulin dependent diabetes mellitus) (HCC)     Osteoarthritis     Psoriasis     Type II or unspecified type diabetes mellitus without mention of complication, not stated as uncontrolled     Unspecified sleep apnea          SURGICAL HISTORY       Past Surgical History:   Procedure Laterality Date    PALATE SURGERY  2012         CURRENT MEDICATIONS       Previous Medications    ATORVASTATIN (LIPITOR) 80 MG TABLET    Take 1 tablet by mouth nightly    CLOTRIMAZOLE (LOTRIMIN) 1 % CREAM    Apply topically 2 times daily. COTTON BALLS MISC    1 box by Does not apply route as needed (glucose monitoring)    FLUCONAZOLE (DIFLUCAN) 150 MG TABLET    Take 1 tablet by mouth once a week    GLUCOSE BLOOD VI TEST STRIPS (ASCENSIA AUTODISC VI;ONE TOUCH ULTRA TEST VI) STRIP    Use with associated glucose meter.     INSULIN GLARGINE (LANTUS SOLOSTAR) 100 UNIT/ML INJECTION PEN    Inject 30 Units into the skin 2 times daily    INSULIN PEN NEEDLE 31G X 6 MM MISC    1 each by Does not apply route daily    LANCETS MISC    1 each by Does not apply route 3 times daily    LEVOTHYROXINE (SYNTHROID) 137 MCG TABLET Take 1 tablet by mouth daily    LISINOPRIL (PRINIVIL;ZESTRIL) 40 MG TABLET    Take 1 tablet by mouth daily    METFORMIN (GLUCOPHAGE) 1000 MG TABLET    Take 1 tablet by mouth 2 times daily (with meals)    METOPROLOL TARTRATE (LOPRESSOR) 25 MG TABLET    Take 1 tablet by mouth 2 times daily       ALLERGIES     Pcn [penicillins]    FAMILY HISTORY       Family History   Problem Relation Age of Onset    Diabetes Mother     Hypertension Mother     Diabetes Father     Diabetes Brother     Hypertension Brother           SOCIAL HISTORY       Social History     Social History    Marital status: Single     Spouse name: N/A    Number of children: N/A    Years of education: N/A     Social History Main Topics    Smoking status: Never Smoker    Smokeless tobacco: Never Used    Alcohol use No    Drug use: Yes     Types: Marijuana    Sexual activity: Yes     Partners: Female     Other Topics Concern    None     Social History Narrative    None       SCREENINGS             PHYSICAL EXAM    (up to 7 for level 4, 8 or more for level 5)     ED Triage Vitals [12/21/18 1848]   BP Temp Temp Source Pulse Resp SpO2 Height Weight   (!) 156/96 98.1 °F (36.7 °C) Oral 89 15 96 % 5' 8\" (1.727 m) 180 lb (81.6 kg)       Physical Exam   Constitutional: He is oriented to person, place, and time. He appears well-developed and well-nourished. No distress. HENT:   Head: Normocephalic and atraumatic. Mouth/Throat: No oropharyngeal exudate. Eyes: Pupils are equal, round, and reactive to light. EOM are normal. No scleral icterus. Neck: Normal range of motion. Neck supple. Cardiovascular: Normal rate, regular rhythm and normal heart sounds. Exam reveals no gallop and no friction rub. No murmur heard. Pulmonary/Chest: Effort normal and breath sounds normal. No stridor. No respiratory distress. He has no wheezes. He has no rales. Abdominal: Soft. Bowel sounds are normal. He exhibits no distension. There is no tenderness.

## 2019-01-01 ENCOUNTER — HOSPITAL ENCOUNTER (OUTPATIENT)
Age: 54
Setting detail: SPECIMEN
Discharge: HOME | End: 2019-04-12
Payer: MEDICARE

## 2019-01-01 ENCOUNTER — HOSPITAL ENCOUNTER (OUTPATIENT)
Age: 54
Setting detail: SPECIMEN
Discharge: HOME | End: 2019-05-28
Payer: MEDICARE

## 2019-01-01 ENCOUNTER — HOSPITAL ENCOUNTER (OUTPATIENT)
Age: 54
Setting detail: SPECIMEN
Discharge: HOME | End: 2019-05-03
Payer: MEDICARE

## 2019-01-01 ENCOUNTER — HOSPITAL ENCOUNTER (OUTPATIENT)
Age: 54
Setting detail: OBSERVATION
Discharge: HOME | End: 2019-02-06
Attending: EMERGENCY MEDICINE | Admitting: INTERNAL MEDICINE
Payer: MEDICARE

## 2019-01-01 ENCOUNTER — HOSPITAL ENCOUNTER (OUTPATIENT)
Age: 54
Setting detail: SPECIMEN
Discharge: HOME | End: 2019-05-05
Payer: MEDICARE

## 2019-01-01 ENCOUNTER — HOSPITAL ENCOUNTER (OUTPATIENT)
Age: 54
Setting detail: SPECIMEN
Discharge: HOME | End: 2019-04-23
Payer: MEDICARE

## 2019-01-01 ENCOUNTER — HOSPITAL ENCOUNTER (OUTPATIENT)
Age: 54
Setting detail: SPECIMEN
Discharge: HOME | End: 2019-04-03
Payer: MEDICARE

## 2019-01-01 ENCOUNTER — CARE COORDINATION (OUTPATIENT)
Dept: CARE COORDINATION | Age: 54
End: 2019-01-01

## 2019-01-01 ENCOUNTER — HOSPITAL ENCOUNTER (OUTPATIENT)
Age: 54
Setting detail: SPECIMEN
Discharge: HOME | End: 2019-07-12
Payer: MEDICARE

## 2019-01-01 ENCOUNTER — APPOINTMENT (OUTPATIENT)
Dept: MRI IMAGING | Age: 54
DRG: 622 | End: 2019-01-01
Payer: MEDICARE

## 2019-01-01 ENCOUNTER — TELEPHONE (OUTPATIENT)
Dept: FAMILY MEDICINE CLINIC | Age: 54
End: 2019-01-01

## 2019-01-01 ENCOUNTER — HOSPITAL ENCOUNTER (OUTPATIENT)
Age: 54
Setting detail: SPECIMEN
Discharge: HOME | End: 2019-06-11
Payer: MEDICARE

## 2019-01-01 ENCOUNTER — TELEPHONE (OUTPATIENT)
Dept: INFECTIOUS DISEASES | Age: 54
End: 2019-01-01

## 2019-01-01 ENCOUNTER — HOSPITAL ENCOUNTER (OUTPATIENT)
Age: 54
Setting detail: SPECIMEN
Discharge: HOME | End: 2019-05-17
Payer: MEDICARE

## 2019-01-01 ENCOUNTER — APPOINTMENT (OUTPATIENT)
Dept: GENERAL RADIOLOGY | Age: 54
End: 2019-01-01
Payer: MEDICARE

## 2019-01-01 ENCOUNTER — HOSPITAL ENCOUNTER (OUTPATIENT)
Age: 54
Setting detail: SPECIMEN
Discharge: HOME | End: 2019-07-03
Payer: MEDICARE

## 2019-01-01 ENCOUNTER — HOSPITAL ENCOUNTER (OUTPATIENT)
Age: 54
Setting detail: SPECIMEN
Discharge: HOME | End: 2019-06-04
Payer: MEDICARE

## 2019-01-01 ENCOUNTER — HOSPITAL ENCOUNTER (OUTPATIENT)
Age: 54
Setting detail: SPECIMEN
Discharge: HOME | End: 2019-04-06
Payer: MEDICARE

## 2019-01-01 ENCOUNTER — OFFICE VISIT (OUTPATIENT)
Dept: INFECTIOUS DISEASES | Age: 54
End: 2019-01-01

## 2019-01-01 ENCOUNTER — APPOINTMENT (OUTPATIENT)
Dept: GENERAL RADIOLOGY | Age: 54
DRG: 622 | End: 2019-01-01
Payer: MEDICARE

## 2019-01-01 ENCOUNTER — HOSPITAL ENCOUNTER (OUTPATIENT)
Age: 54
Setting detail: SPECIMEN
Discharge: HOME | End: 2019-05-07
Payer: MEDICARE

## 2019-01-01 ENCOUNTER — HOSPITAL ENCOUNTER (INPATIENT)
Age: 54
LOS: 8 days | Discharge: HOME | DRG: 622 | End: 2019-01-31
Attending: EMERGENCY MEDICINE | Admitting: INTERNAL MEDICINE
Payer: MEDICARE

## 2019-01-01 ENCOUNTER — HOSPITAL ENCOUNTER (OUTPATIENT)
Age: 54
Setting detail: SPECIMEN
Discharge: HOME | End: 2019-06-14
Payer: MEDICARE

## 2019-01-01 ENCOUNTER — HOSPITAL ENCOUNTER (OUTPATIENT)
Age: 54
Setting detail: SPECIMEN
Discharge: HOME | End: 2019-05-31
Payer: MEDICARE

## 2019-01-01 ENCOUNTER — HOSPITAL ENCOUNTER (OUTPATIENT)
Age: 54
Setting detail: SPECIMEN
Discharge: HOME | End: 2019-04-09
Payer: MEDICARE

## 2019-01-01 VITALS
DIASTOLIC BLOOD PRESSURE: 76 MMHG | HEART RATE: 86 BPM | OXYGEN SATURATION: 95 % | HEIGHT: 68 IN | SYSTOLIC BLOOD PRESSURE: 107 MMHG | BODY MASS INDEX: 22.95 KG/M2 | TEMPERATURE: 97.2 F | WEIGHT: 151.46 LBS | RESPIRATION RATE: 16 BRPM

## 2019-01-01 VITALS
TEMPERATURE: 97.7 F | HEIGHT: 68 IN | DIASTOLIC BLOOD PRESSURE: 69 MMHG | WEIGHT: 180 LBS | SYSTOLIC BLOOD PRESSURE: 97 MMHG | HEART RATE: 104 BPM | BODY MASS INDEX: 27.28 KG/M2

## 2019-01-01 VITALS
HEIGHT: 68 IN | BODY MASS INDEX: 27.26 KG/M2 | DIASTOLIC BLOOD PRESSURE: 77 MMHG | WEIGHT: 179.9 LBS | SYSTOLIC BLOOD PRESSURE: 138 MMHG | TEMPERATURE: 97.5 F | HEART RATE: 98 BPM

## 2019-01-01 VITALS
SYSTOLIC BLOOD PRESSURE: 148 MMHG | RESPIRATION RATE: 16 BRPM | TEMPERATURE: 97.2 F | HEART RATE: 74 BPM | WEIGHT: 160 LBS | BODY MASS INDEX: 24.25 KG/M2 | HEIGHT: 68 IN | DIASTOLIC BLOOD PRESSURE: 85 MMHG | OXYGEN SATURATION: 98 %

## 2019-01-01 DIAGNOSIS — R73.9 HYPERGLYCEMIA: ICD-10-CM

## 2019-01-01 DIAGNOSIS — R19.7 DIARRHEA OF PRESUMED INFECTIOUS ORIGIN: Primary | ICD-10-CM

## 2019-01-01 DIAGNOSIS — L97.519 DIABETIC ULCER OF RIGHT FOOT ASSOCIATED WITH TYPE 2 DIABETES MELLITUS, UNSPECIFIED PART OF FOOT, UNSPECIFIED ULCER STAGE (HCC): Primary | ICD-10-CM

## 2019-01-01 DIAGNOSIS — R60.0 BILATERAL LOWER EXTREMITY EDEMA: ICD-10-CM

## 2019-01-01 DIAGNOSIS — A04.72 CLOSTRIDIUM DIFFICILE COLITIS: Primary | ICD-10-CM

## 2019-01-01 DIAGNOSIS — S81.801D OPEN WOUND OF RIGHT LOWER LEG, SUBSEQUENT ENCOUNTER: ICD-10-CM

## 2019-01-01 DIAGNOSIS — E11.621 DIABETIC ULCER OF RIGHT FOOT ASSOCIATED WITH TYPE 2 DIABETES MELLITUS, UNSPECIFIED PART OF FOOT, UNSPECIFIED ULCER STAGE (HCC): Primary | ICD-10-CM

## 2019-01-01 LAB
-: NORMAL
ABSOLUTE EOS #: 0 K/UL (ref 0–0.4)
ABSOLUTE EOS #: 0.1 K/UL (ref 0–0.4)
ABSOLUTE EOS #: 0.2 K/UL (ref 0–0.4)
ABSOLUTE IMMATURE GRANULOCYTE: ABNORMAL K/UL (ref 0–0.3)
ABSOLUTE LYMPH #: 1 K/UL (ref 1–4.8)
ABSOLUTE LYMPH #: 1.1 K/UL (ref 1–4.8)
ABSOLUTE LYMPH #: 2.2 K/UL (ref 1–4.8)
ABSOLUTE MONO #: 0.6 K/UL (ref 0.1–1.3)
ABSOLUTE MONO #: 0.8 K/UL (ref 0.1–1.3)
ABSOLUTE MONO #: 1.1 K/UL (ref 0.1–1.3)
ALBUMIN SERPL-MCNC: 3.5 G/DL (ref 3.5–5.2)
ALBUMIN SERPL-MCNC: 3.6 G/DL (ref 3.5–5.2)
ALBUMIN/GLOBULIN RATIO: ABNORMAL (ref 1–2.5)
ALBUMIN/GLOBULIN RATIO: ABNORMAL (ref 1–2.5)
ALP BLD-CCNC: 126 U/L (ref 40–129)
ALP BLD-CCNC: 151 U/L (ref 40–129)
ALT SERPL-CCNC: 13 U/L (ref 5–41)
ALT SERPL-CCNC: 67 U/L (ref 5–41)
ANION GAP SERPL CALCULATED.3IONS-SCNC: 10 MMOL/L (ref 9–17)
ANION GAP SERPL CALCULATED.3IONS-SCNC: 11 MMOL/L (ref 9–17)
ANION GAP SERPL CALCULATED.3IONS-SCNC: 11 MMOL/L (ref 9–17)
ANION GAP SERPL CALCULATED.3IONS-SCNC: 12 MMOL/L (ref 9–17)
ANION GAP SERPL CALCULATED.3IONS-SCNC: 14 MMOL/L (ref 9–17)
ANION GAP SERPL CALCULATED.3IONS-SCNC: 14 MMOL/L (ref 9–17)
ANION GAP SERPL CALCULATED.3IONS-SCNC: 15 MMOL/L (ref 9–17)
ANION GAP SERPL CALCULATED.3IONS-SCNC: 16 MMOL/L (ref 9–17)
ANION GAP SERPL CALCULATED.3IONS-SCNC: 16 MMOL/L (ref 9–17)
ANION GAP SERPL CALCULATED.3IONS-SCNC: 9 MMOL/L (ref 9–17)
AST SERPL-CCNC: 12 U/L
AST SERPL-CCNC: 18 U/L
BASOPHILS # BLD: 1 % (ref 0–2)
BASOPHILS ABSOLUTE: 0.1 K/UL (ref 0–0.2)
BILIRUB SERPL-MCNC: 0.38 MG/DL (ref 0.3–1.2)
BILIRUB SERPL-MCNC: 0.49 MG/DL (ref 0.3–1.2)
BNP INTERPRETATION: NORMAL
BUN BLDV-MCNC: 10 MG/DL (ref 6–20)
BUN BLDV-MCNC: 10 MG/DL (ref 6–20)
BUN BLDV-MCNC: 12 MG/DL (ref 6–20)
BUN BLDV-MCNC: 13 MG/DL (ref 6–20)
BUN BLDV-MCNC: 16 MG/DL (ref 6–20)
BUN BLDV-MCNC: 17 MG/DL (ref 6–20)
BUN BLDV-MCNC: 18 MG/DL (ref 6–20)
BUN BLDV-MCNC: 22 MG/DL (ref 6–20)
BUN BLDV-MCNC: 22 MG/DL (ref 6–20)
BUN BLDV-MCNC: 23 MG/DL (ref 6–20)
BUN BLDV-MCNC: 51 MG/DL (ref 6–20)
BUN BLDV-MCNC: 8 MG/DL (ref 6–20)
BUN BLDV-MCNC: 9 MG/DL (ref 6–20)
BUN BLDV-MCNC: 9 MG/DL (ref 6–20)
BUN/CREAT BLD: ABNORMAL (ref 9–20)
C DIFF AG + TOXIN: NORMAL
C DIFF AG + TOXIN: NORMAL
C DIFFICILE TOXINS, PCR: ABNORMAL
C-REACTIVE PROTEIN: 1.8 MG/L (ref 0–5)
CALCIUM SERPL-MCNC: 8.2 MG/DL (ref 8.6–10.4)
CALCIUM SERPL-MCNC: 8.4 MG/DL (ref 8.6–10.4)
CALCIUM SERPL-MCNC: 8.5 MG/DL (ref 8.6–10.4)
CALCIUM SERPL-MCNC: 8.6 MG/DL (ref 8.6–10.4)
CALCIUM SERPL-MCNC: 8.6 MG/DL (ref 8.6–10.4)
CALCIUM SERPL-MCNC: 8.7 MG/DL (ref 8.6–10.4)
CALCIUM SERPL-MCNC: 8.8 MG/DL (ref 8.6–10.4)
CALCIUM SERPL-MCNC: 8.8 MG/DL (ref 8.6–10.4)
CALCIUM SERPL-MCNC: 8.9 MG/DL (ref 8.6–10.4)
CALCIUM SERPL-MCNC: 9 MG/DL (ref 8.6–10.4)
CALCIUM SERPL-MCNC: 9 MG/DL (ref 8.6–10.4)
CHLORIDE BLD-SCNC: 100 MMOL/L (ref 98–107)
CHLORIDE BLD-SCNC: 101 MMOL/L (ref 98–107)
CHLORIDE BLD-SCNC: 101 MMOL/L (ref 98–107)
CHLORIDE BLD-SCNC: 104 MMOL/L (ref 98–107)
CHLORIDE BLD-SCNC: 93 MMOL/L (ref 98–107)
CHLORIDE BLD-SCNC: 93 MMOL/L (ref 98–107)
CHLORIDE BLD-SCNC: 94 MMOL/L (ref 98–107)
CHLORIDE BLD-SCNC: 95 MMOL/L (ref 98–107)
CHLORIDE BLD-SCNC: 96 MMOL/L (ref 98–107)
CHLORIDE BLD-SCNC: 97 MMOL/L (ref 98–107)
CHLORIDE BLD-SCNC: 97 MMOL/L (ref 98–107)
CHLORIDE BLD-SCNC: 98 MMOL/L (ref 98–107)
CHLORIDE BLD-SCNC: 99 MMOL/L (ref 98–107)
CO2: 13 MMOL/L (ref 20–31)
CO2: 20 MMOL/L (ref 20–31)
CO2: 21 MMOL/L (ref 20–31)
CO2: 21 MMOL/L (ref 20–31)
CO2: 22 MMOL/L (ref 20–31)
CO2: 22 MMOL/L (ref 20–31)
CO2: 25 MMOL/L (ref 20–31)
CO2: 25 MMOL/L (ref 20–31)
CO2: 26 MMOL/L (ref 20–31)
CO2: 26 MMOL/L (ref 20–31)
CO2: 27 MMOL/L (ref 20–31)
CO2: 27 MMOL/L (ref 20–31)
CO2: 31 MMOL/L (ref 20–31)
CREAT SERPL-MCNC: 0.42 MG/DL (ref 0.7–1.2)
CREAT SERPL-MCNC: 0.44 MG/DL (ref 0.7–1.2)
CREAT SERPL-MCNC: 0.47 MG/DL (ref 0.7–1.2)
CREAT SERPL-MCNC: 0.48 MG/DL (ref 0.7–1.2)
CREAT SERPL-MCNC: 0.53 MG/DL (ref 0.7–1.2)
CREAT SERPL-MCNC: 0.53 MG/DL (ref 0.7–1.2)
CREAT SERPL-MCNC: 0.55 MG/DL (ref 0.7–1.2)
CREAT SERPL-MCNC: 0.57 MG/DL (ref 0.7–1.2)
CREAT SERPL-MCNC: 0.58 MG/DL (ref 0.7–1.2)
CREAT SERPL-MCNC: 0.58 MG/DL (ref 0.7–1.2)
CREAT SERPL-MCNC: 0.62 MG/DL (ref 0.7–1.2)
CREAT SERPL-MCNC: 0.64 MG/DL (ref 0.7–1.2)
CREAT SERPL-MCNC: 0.64 MG/DL (ref 0.7–1.2)
CREAT SERPL-MCNC: 0.67 MG/DL (ref 0.7–1.2)
CREAT SERPL-MCNC: 0.7 MG/DL (ref 0.7–1.2)
CREAT SERPL-MCNC: 0.72 MG/DL (ref 0.7–1.2)
CREAT SERPL-MCNC: 0.83 MG/DL (ref 0.7–1.2)
CREAT SERPL-MCNC: 0.93 MG/DL (ref 0.7–1.2)
CREAT SERPL-MCNC: 1 MG/DL (ref 0.7–1.2)
CREAT SERPL-MCNC: 1.26 MG/DL (ref 0.7–1.2)
CULTURE: ABNORMAL
CULTURE: NORMAL
CULTURE: NORMAL
DIFFERENTIAL TYPE: ABNORMAL
DIRECT EXAM: ABNORMAL
EOSINOPHILS RELATIVE PERCENT: 0 % (ref 0–4)
EOSINOPHILS RELATIVE PERCENT: 1 % (ref 0–4)
EOSINOPHILS RELATIVE PERCENT: 2 % (ref 0–4)
ESTIMATED AVERAGE GLUCOSE: 171 MG/DL
GFR AFRICAN AMERICAN: >60 ML/MIN
GFR NON-AFRICAN AMERICAN: 59 ML/MIN
GFR NON-AFRICAN AMERICAN: >60 ML/MIN
GFR SERPL CREATININE-BSD FRML MDRD: ABNORMAL ML/MIN/{1.73_M2}
GFR SERPL CREATININE-BSD FRML MDRD: NORMAL ML/MIN/{1.73_M2}
GLUCOSE BLD-MCNC: 101 MG/DL (ref 75–110)
GLUCOSE BLD-MCNC: 116 MG/DL (ref 75–110)
GLUCOSE BLD-MCNC: 118 MG/DL (ref 70–99)
GLUCOSE BLD-MCNC: 119 MG/DL (ref 75–110)
GLUCOSE BLD-MCNC: 122 MG/DL (ref 75–110)
GLUCOSE BLD-MCNC: 123 MG/DL (ref 75–110)
GLUCOSE BLD-MCNC: 129 MG/DL (ref 75–110)
GLUCOSE BLD-MCNC: 135 MG/DL (ref 75–110)
GLUCOSE BLD-MCNC: 138 MG/DL (ref 75–110)
GLUCOSE BLD-MCNC: 143 MG/DL (ref 75–110)
GLUCOSE BLD-MCNC: 146 MG/DL (ref 75–110)
GLUCOSE BLD-MCNC: 151 MG/DL (ref 75–110)
GLUCOSE BLD-MCNC: 158 MG/DL (ref 75–110)
GLUCOSE BLD-MCNC: 160 MG/DL (ref 75–110)
GLUCOSE BLD-MCNC: 161 MG/DL (ref 75–110)
GLUCOSE BLD-MCNC: 173 MG/DL (ref 75–110)
GLUCOSE BLD-MCNC: 174 MG/DL (ref 75–110)
GLUCOSE BLD-MCNC: 176 MG/DL (ref 75–110)
GLUCOSE BLD-MCNC: 185 MG/DL (ref 75–110)
GLUCOSE BLD-MCNC: 186 MG/DL (ref 75–110)
GLUCOSE BLD-MCNC: 187 MG/DL (ref 70–99)
GLUCOSE BLD-MCNC: 199 MG/DL (ref 75–110)
GLUCOSE BLD-MCNC: 205 MG/DL (ref 75–110)
GLUCOSE BLD-MCNC: 208 MG/DL (ref 75–110)
GLUCOSE BLD-MCNC: 213 MG/DL (ref 75–110)
GLUCOSE BLD-MCNC: 215 MG/DL (ref 75–110)
GLUCOSE BLD-MCNC: 219 MG/DL (ref 75–110)
GLUCOSE BLD-MCNC: 221 MG/DL (ref 75–110)
GLUCOSE BLD-MCNC: 227 MG/DL (ref 70–99)
GLUCOSE BLD-MCNC: 231 MG/DL (ref 75–110)
GLUCOSE BLD-MCNC: 231 MG/DL (ref 75–110)
GLUCOSE BLD-MCNC: 235 MG/DL (ref 75–110)
GLUCOSE BLD-MCNC: 238 MG/DL (ref 75–110)
GLUCOSE BLD-MCNC: 244 MG/DL (ref 75–110)
GLUCOSE BLD-MCNC: 245 MG/DL (ref 75–110)
GLUCOSE BLD-MCNC: 249 MG/DL (ref 70–99)
GLUCOSE BLD-MCNC: 249 MG/DL (ref 75–110)
GLUCOSE BLD-MCNC: 250 MG/DL (ref 75–110)
GLUCOSE BLD-MCNC: 264 MG/DL (ref 75–110)
GLUCOSE BLD-MCNC: 269 MG/DL
GLUCOSE BLD-MCNC: 269 MG/DL (ref 75–110)
GLUCOSE BLD-MCNC: 271 MG/DL (ref 70–99)
GLUCOSE BLD-MCNC: 278 MG/DL (ref 70–99)
GLUCOSE BLD-MCNC: 287 MG/DL (ref 70–99)
GLUCOSE BLD-MCNC: 302 MG/DL (ref 70–99)
GLUCOSE BLD-MCNC: 304 MG/DL (ref 75–110)
GLUCOSE BLD-MCNC: 306 MG/DL (ref 75–110)
GLUCOSE BLD-MCNC: 312 MG/DL (ref 75–110)
GLUCOSE BLD-MCNC: 340 MG/DL (ref 70–99)
GLUCOSE BLD-MCNC: 356 MG/DL (ref 70–99)
GLUCOSE BLD-MCNC: 363 MG/DL (ref 70–99)
GLUCOSE BLD-MCNC: 449 MG/DL (ref 75–110)
GLUCOSE BLD-MCNC: 479 MG/DL (ref 70–99)
GLUCOSE BLD-MCNC: 518 MG/DL (ref 70–99)
GLUCOSE BLD-MCNC: 93 MG/DL (ref 75–110)
GLUCOSE BLD-MCNC: 96 MG/DL (ref 75–110)
GLUCOSE BLD-MCNC: 98 MG/DL (ref 75–110)
HBA1C MFR BLD: 7.6 % (ref 4–6)
HCT VFR BLD CALC: 25.2 % (ref 40.7–50.3)
HCT VFR BLD CALC: 29.6 % (ref 40.7–50.3)
HCT VFR BLD CALC: 30.4 % (ref 40.7–50.3)
HCT VFR BLD CALC: 31.3 % (ref 40.7–50.3)
HCT VFR BLD CALC: 31.5 % (ref 40.7–50.3)
HCT VFR BLD CALC: 33.9 % (ref 40.7–50.3)
HCT VFR BLD CALC: 34.8 % (ref 41–53)
HCT VFR BLD CALC: 35 % (ref 41–53)
HCT VFR BLD CALC: 35.5 % (ref 40.7–50.3)
HCT VFR BLD CALC: 36.3 % (ref 41–53)
HCT VFR BLD CALC: 40.7 % (ref 41–53)
HCT VFR BLD CALC: 41.8 % (ref 41–53)
HEMOGLOBIN: 10 G/DL (ref 13–17)
HEMOGLOBIN: 11.1 G/DL (ref 13–17)
HEMOGLOBIN: 11.4 G/DL (ref 13–17)
HEMOGLOBIN: 12 G/DL (ref 13.5–17.5)
HEMOGLOBIN: 12.5 G/DL (ref 13.5–17.5)
HEMOGLOBIN: 12.5 G/DL (ref 13.5–17.5)
HEMOGLOBIN: 14.1 G/DL (ref 13.5–17.5)
HEMOGLOBIN: 14.4 G/DL (ref 13.5–17.5)
HEMOGLOBIN: 8.1 G/DL (ref 13–17)
HEMOGLOBIN: 9.4 G/DL (ref 13–17)
HEMOGLOBIN: 9.6 G/DL (ref 13–17)
HEMOGLOBIN: 9.9 G/DL (ref 13–17)
IMMATURE GRANULOCYTES: ABNORMAL %
LACTIC ACID: 1.9 MMOL/L (ref 0.5–2.2)
LYMPHOCYTES # BLD: 12 % (ref 24–44)
LYMPHOCYTES # BLD: 12 % (ref 24–44)
LYMPHOCYTES # BLD: 24 % (ref 24–44)
Lab: ABNORMAL
Lab: NORMAL
Lab: NORMAL
MAGNESIUM: 1.5 MG/DL (ref 1.6–2.6)
MAGNESIUM: 1.5 MG/DL (ref 1.6–2.6)
MAGNESIUM: 1.6 MG/DL (ref 1.6–2.6)
MCH RBC QN AUTO: 26.8 PG (ref 25.2–33.5)
MCH RBC QN AUTO: 26.9 PG (ref 25.2–33.5)
MCH RBC QN AUTO: 27 PG (ref 25.2–33.5)
MCH RBC QN AUTO: 27.9 PG (ref 25.2–33.5)
MCH RBC QN AUTO: 29.8 PG (ref 26–34)
MCH RBC QN AUTO: 29.9 PG (ref 26–34)
MCH RBC QN AUTO: 30.2 PG (ref 26–34)
MCH RBC QN AUTO: 30.3 PG (ref 26–34)
MCH RBC QN AUTO: 31.2 PG (ref 26–34)
MCHC RBC AUTO-ENTMCNC: 31.8 G/DL (ref 28.4–34.8)
MCHC RBC AUTO-ENTMCNC: 32.1 G/DL (ref 28.4–34.8)
MCHC RBC AUTO-ENTMCNC: 32.1 G/DL (ref 28.4–34.8)
MCHC RBC AUTO-ENTMCNC: 32.7 G/DL (ref 28.4–34.8)
MCHC RBC AUTO-ENTMCNC: 34.4 G/DL (ref 31–37)
MCHC RBC AUTO-ENTMCNC: 34.4 G/DL (ref 31–37)
MCHC RBC AUTO-ENTMCNC: 34.5 G/DL (ref 31–37)
MCHC RBC AUTO-ENTMCNC: 34.6 G/DL (ref 31–37)
MCHC RBC AUTO-ENTMCNC: 35.8 G/DL (ref 31–37)
MCV RBC AUTO: 83.4 FL (ref 82.6–102.9)
MCV RBC AUTO: 83.9 FL (ref 82.6–102.9)
MCV RBC AUTO: 84.6 FL (ref 82.6–102.9)
MCV RBC AUTO: 85.2 FL (ref 82.6–102.9)
MCV RBC AUTO: 86.3 FL (ref 80–100)
MCV RBC AUTO: 86.8 FL (ref 80–100)
MCV RBC AUTO: 87 FL (ref 80–100)
MCV RBC AUTO: 87.5 FL (ref 80–100)
MCV RBC AUTO: 87.9 FL (ref 80–100)
MONOCYTES # BLD: 12 % (ref 1–7)
MONOCYTES # BLD: 6 % (ref 1–7)
MONOCYTES # BLD: 9 % (ref 1–7)
NRBC AUTOMATED: 0 PER 100 WBC
NRBC AUTOMATED: ABNORMAL PER 100 WBC
ORGANISM: ABNORMAL
ORGANISM: ABNORMAL
PDW BLD-RTO: 13.3 % (ref 11.5–14.9)
PDW BLD-RTO: 13.5 % (ref 11.5–14.9)
PDW BLD-RTO: 13.6 % (ref 11.5–14.9)
PDW BLD-RTO: 13.6 % (ref 11.5–14.9)
PDW BLD-RTO: 13.9 % (ref 11.5–14.9)
PDW BLD-RTO: 14.2 % (ref 11.8–14.4)
PDW BLD-RTO: 14.6 % (ref 11.8–14.4)
PDW BLD-RTO: 15.9 % (ref 11.8–14.4)
PDW BLD-RTO: 16.1 % (ref 11.8–14.4)
PLATELET # BLD: 234 K/UL (ref 150–450)
PLATELET # BLD: 236 K/UL (ref 150–450)
PLATELET # BLD: 253 K/UL (ref 150–450)
PLATELET # BLD: 260 K/UL (ref 150–450)
PLATELET # BLD: 284 K/UL (ref 150–450)
PLATELET # BLD: 294 K/UL (ref 138–453)
PLATELET # BLD: 334 K/UL (ref 138–453)
PLATELET # BLD: 392 K/UL (ref 138–453)
PLATELET # BLD: 520 K/UL (ref 138–453)
PLATELET ESTIMATE: ABNORMAL
PMV BLD AUTO: 7.4 FL (ref 6–12)
PMV BLD AUTO: 7.4 FL (ref 6–12)
PMV BLD AUTO: 7.5 FL (ref 6–12)
PMV BLD AUTO: 7.5 FL (ref 6–12)
PMV BLD AUTO: 8.1 FL (ref 6–12)
PMV BLD AUTO: 8.6 FL (ref 8.1–13.5)
PMV BLD AUTO: 8.8 FL (ref 8.1–13.5)
PMV BLD AUTO: 9.2 FL (ref 8.1–13.5)
PMV BLD AUTO: 9.2 FL (ref 8.1–13.5)
POTASSIUM SERPL-SCNC: 3.4 MMOL/L (ref 3.7–5.3)
POTASSIUM SERPL-SCNC: 3.6 MMOL/L (ref 3.7–5.3)
POTASSIUM SERPL-SCNC: 3.7 MMOL/L (ref 3.7–5.3)
POTASSIUM SERPL-SCNC: 3.7 MMOL/L (ref 3.7–5.3)
POTASSIUM SERPL-SCNC: 3.9 MMOL/L (ref 3.7–5.3)
POTASSIUM SERPL-SCNC: 4 MMOL/L (ref 3.7–5.3)
POTASSIUM SERPL-SCNC: 4 MMOL/L (ref 3.7–5.3)
POTASSIUM SERPL-SCNC: 4.1 MMOL/L (ref 3.7–5.3)
POTASSIUM SERPL-SCNC: 4.3 MMOL/L (ref 3.7–5.3)
POTASSIUM SERPL-SCNC: 4.4 MMOL/L (ref 3.7–5.3)
POTASSIUM SERPL-SCNC: 4.5 MMOL/L (ref 3.7–5.3)
POTASSIUM SERPL-SCNC: 4.7 MMOL/L (ref 3.7–5.3)
POTASSIUM SERPL-SCNC: 5.3 MMOL/L (ref 3.7–5.3)
PREALBUMIN: 16.4 MG/DL (ref 20–40)
PRO-BNP: 131 PG/ML
PROCALCITONIN: 0.08 NG/ML
RBC # BLD: 3.02 M/UL (ref 4.21–5.77)
RBC # BLD: 3.5 M/UL (ref 4.21–5.77)
RBC # BLD: 3.97 M/UL (ref 4.5–5.9)
RBC # BLD: 3.98 M/UL (ref 4.21–5.77)
RBC # BLD: 4.02 M/UL (ref 4.5–5.9)
RBC # BLD: 4.13 M/UL (ref 4.5–5.9)
RBC # BLD: 4.23 M/UL (ref 4.21–5.77)
RBC # BLD: 4.72 M/UL (ref 4.5–5.9)
RBC # BLD: 4.82 M/UL (ref 4.5–5.9)
RBC # BLD: ABNORMAL 10*6/UL
REASON FOR REJECTION: NORMAL
SEG NEUTROPHILS: 61 % (ref 36–66)
SEG NEUTROPHILS: 77 % (ref 36–66)
SEG NEUTROPHILS: 81 % (ref 36–66)
SEGMENTED NEUTROPHILS ABSOLUTE COUNT: 5.6 K/UL (ref 1.3–9.1)
SEGMENTED NEUTROPHILS ABSOLUTE COUNT: 6.8 K/UL (ref 1.3–9.1)
SEGMENTED NEUTROPHILS ABSOLUTE COUNT: 7.8 K/UL (ref 1.3–9.1)
SODIUM BLD-SCNC: 123 MMOL/L (ref 135–144)
SODIUM BLD-SCNC: 130 MMOL/L (ref 135–144)
SODIUM BLD-SCNC: 130 MMOL/L (ref 135–144)
SODIUM BLD-SCNC: 131 MMOL/L (ref 135–144)
SODIUM BLD-SCNC: 132 MMOL/L (ref 135–144)
SODIUM BLD-SCNC: 134 MMOL/L (ref 135–144)
SODIUM BLD-SCNC: 135 MMOL/L (ref 135–144)
SODIUM BLD-SCNC: 136 MMOL/L (ref 135–144)
SODIUM BLD-SCNC: 138 MMOL/L (ref 135–144)
SODIUM BLD-SCNC: 138 MMOL/L (ref 135–144)
SODIUM BLD-SCNC: 141 MMOL/L (ref 135–144)
SPECIMEN DESCRIPTION: ABNORMAL
SPECIMEN DESCRIPTION: NORMAL
STATUS: ABNORMAL
STATUS: NORMAL
STATUS: NORMAL
T4 TOTAL: 2.7 UG/DL (ref 4.5–12)
T4 TOTAL: 3.3 UG/DL (ref 4.5–12)
T4 TOTAL: 4.1 UG/DL (ref 4.5–12)
T4 TOTAL: 4.2 UG/DL (ref 4.5–12)
T4 TOTAL: 5.6 UG/DL (ref 4.5–12)
TOTAL PROTEIN: 6.4 G/DL (ref 6.4–8.3)
TOTAL PROTEIN: 6.6 G/DL (ref 6.4–8.3)
TSH SERPL DL<=0.05 MIU/L-ACNC: 16.79 MIU/L (ref 0.3–5)
TSH SERPL DL<=0.05 MIU/L-ACNC: 28.93 MIU/L (ref 0.3–5)
TSH SERPL DL<=0.05 MIU/L-ACNC: 34.15 MIU/L (ref 0.3–5)
TSH SERPL DL<=0.05 MIU/L-ACNC: 6.81 MIU/L (ref 0.3–5)
TSH SERPL DL<=0.05 MIU/L-ACNC: 63.06 MIU/L (ref 0.3–5)
VANCOMYCIN TROUGH DATE LAST DOSE: NORMAL
VANCOMYCIN TROUGH DATE LAST DOSE: NORMAL
VANCOMYCIN TROUGH DOSE AMOUNT: NORMAL
VANCOMYCIN TROUGH DOSE AMOUNT: NORMAL
VANCOMYCIN TROUGH TIME LAST DOSE: 1830
VANCOMYCIN TROUGH TIME LAST DOSE: 415
VANCOMYCIN TROUGH: 10.7 UG/ML (ref 10–20)
VANCOMYCIN TROUGH: 13.4 UG/ML (ref 10–20)
WBC # BLD: 11.1 K/UL (ref 3.5–11.3)
WBC # BLD: 7.7 K/UL (ref 3.5–11.3)
WBC # BLD: 8.3 K/UL (ref 3.5–11)
WBC # BLD: 8.5 K/UL (ref 3.5–11.3)
WBC # BLD: 8.8 K/UL (ref 3.5–11)
WBC # BLD: 9.1 K/UL (ref 3.5–11)
WBC # BLD: 9.5 K/UL (ref 3.5–11)
WBC # BLD: 9.7 K/UL (ref 3.5–11)
WBC # BLD: 9.8 K/UL (ref 3.5–11.3)
WBC # BLD: ABNORMAL 10*3/UL
ZZ NTE CLEAN UP: ORDERED TEST: NORMAL
ZZ NTE WITH NAME CLEAN UP: SPECIMEN SOURCE: NORMAL

## 2019-01-01 PROCEDURE — 36415 COLL VENOUS BLD VENIPUNCTURE: CPT

## 2019-01-01 PROCEDURE — 6370000000 HC RX 637 (ALT 250 FOR IP): Performed by: STUDENT IN AN ORGANIZED HEALTH CARE EDUCATION/TRAINING PROGRAM

## 2019-01-01 PROCEDURE — 82947 ASSAY GLUCOSE BLOOD QUANT: CPT

## 2019-01-01 PROCEDURE — 84436 ASSAY OF TOTAL THYROXINE: CPT

## 2019-01-01 PROCEDURE — 99217 PR OBSERVATION CARE DISCHARGE MANAGEMENT: CPT | Performed by: INTERNAL MEDICINE

## 2019-01-01 PROCEDURE — 84520 ASSAY OF UREA NITROGEN: CPT

## 2019-01-01 PROCEDURE — 85027 COMPLETE CBC AUTOMATED: CPT

## 2019-01-01 PROCEDURE — 99233 SBSQ HOSP IP/OBS HIGH 50: CPT | Performed by: INTERNAL MEDICINE

## 2019-01-01 PROCEDURE — G0378 HOSPITAL OBSERVATION PER HR: HCPCS

## 2019-01-01 PROCEDURE — 87077 CULTURE AEROBIC IDENTIFY: CPT

## 2019-01-01 PROCEDURE — 6370000000 HC RX 637 (ALT 250 FOR IP): Performed by: INTERNAL MEDICINE

## 2019-01-01 PROCEDURE — 96372 THER/PROPH/DIAG INJ SC/IM: CPT

## 2019-01-01 PROCEDURE — 97535 SELF CARE MNGMENT TRAINING: CPT

## 2019-01-01 PROCEDURE — 99232 SBSQ HOSP IP/OBS MODERATE 35: CPT | Performed by: INTERNAL MEDICINE

## 2019-01-01 PROCEDURE — P9603 ONE-WAY ALLOW PRORATED MILES: HCPCS

## 2019-01-01 PROCEDURE — 2580000003 HC RX 258: Performed by: EMERGENCY MEDICINE

## 2019-01-01 PROCEDURE — 2580000003 HC RX 258: Performed by: INTERNAL MEDICINE

## 2019-01-01 PROCEDURE — 90792 PSYCH DIAG EVAL W/MED SRVCS: CPT | Performed by: PSYCHIATRY & NEUROLOGY

## 2019-01-01 PROCEDURE — 85025 COMPLETE CBC W/AUTO DIFF WBC: CPT

## 2019-01-01 PROCEDURE — 6360000002 HC RX W HCPCS: Performed by: INTERNAL MEDICINE

## 2019-01-01 PROCEDURE — 80048 BASIC METABOLIC PNL TOTAL CA: CPT

## 2019-01-01 PROCEDURE — 6360000002 HC RX W HCPCS: Performed by: EMERGENCY MEDICINE

## 2019-01-01 PROCEDURE — 2580000003 HC RX 258: Performed by: STUDENT IN AN ORGANIZED HEALTH CARE EDUCATION/TRAINING PROGRAM

## 2019-01-01 PROCEDURE — 83735 ASSAY OF MAGNESIUM: CPT

## 2019-01-01 PROCEDURE — 83605 ASSAY OF LACTIC ACID: CPT

## 2019-01-01 PROCEDURE — 80053 COMPREHEN METABOLIC PANEL: CPT

## 2019-01-01 PROCEDURE — 99231 SBSQ HOSP IP/OBS SF/LOW 25: CPT | Performed by: NURSE PRACTITIONER

## 2019-01-01 PROCEDURE — 93923 UPR/LXTR ART STDY 3+ LVLS: CPT

## 2019-01-01 PROCEDURE — 99214 OFFICE O/P EST MOD 30 MIN: CPT | Performed by: INTERNAL MEDICINE

## 2019-01-01 PROCEDURE — 87493 C DIFF AMPLIFIED PROBE: CPT

## 2019-01-01 PROCEDURE — 82565 ASSAY OF CREATININE: CPT

## 2019-01-01 PROCEDURE — 96366 THER/PROPH/DIAG IV INF ADDON: CPT

## 2019-01-01 PROCEDURE — 84443 ASSAY THYROID STIM HORMONE: CPT

## 2019-01-01 PROCEDURE — 1200000000 HC SEMI PRIVATE

## 2019-01-01 PROCEDURE — 96375 TX/PRO/DX INJ NEW DRUG ADDON: CPT

## 2019-01-01 PROCEDURE — 97110 THERAPEUTIC EXERCISES: CPT

## 2019-01-01 PROCEDURE — 6360000002 HC RX W HCPCS: Performed by: STUDENT IN AN ORGANIZED HEALTH CARE EDUCATION/TRAINING PROGRAM

## 2019-01-01 PROCEDURE — 97161 PT EVAL LOW COMPLEX 20 MIN: CPT

## 2019-01-01 PROCEDURE — 87070 CULTURE OTHR SPECIMN AEROBIC: CPT

## 2019-01-01 PROCEDURE — 99221 1ST HOSP IP/OBS SF/LOW 40: CPT | Performed by: INTERNAL MEDICINE

## 2019-01-01 PROCEDURE — 96367 TX/PROPH/DG ADDL SEQ IV INF: CPT

## 2019-01-01 PROCEDURE — 97116 GAIT TRAINING THERAPY: CPT

## 2019-01-01 PROCEDURE — 6370000000 HC RX 637 (ALT 250 FOR IP): Performed by: HOSPITALIST

## 2019-01-01 PROCEDURE — 85018 HEMOGLOBIN: CPT

## 2019-01-01 PROCEDURE — 99239 HOSP IP/OBS DSCHRG MGMT >30: CPT | Performed by: INTERNAL MEDICINE

## 2019-01-01 PROCEDURE — 97166 OT EVAL MOD COMPLEX 45 MIN: CPT

## 2019-01-01 PROCEDURE — 83036 HEMOGLOBIN GLYCOSYLATED A1C: CPT

## 2019-01-01 PROCEDURE — 99215 OFFICE O/P EST HI 40 MIN: CPT | Performed by: INTERNAL MEDICINE

## 2019-01-01 PROCEDURE — 97530 THERAPEUTIC ACTIVITIES: CPT

## 2019-01-01 PROCEDURE — 99232 SBSQ HOSP IP/OBS MODERATE 35: CPT | Performed by: NURSE PRACTITIONER

## 2019-01-01 PROCEDURE — 73630 X-RAY EXAM OF FOOT: CPT

## 2019-01-01 PROCEDURE — 87449 NOS EACH ORGANISM AG IA: CPT

## 2019-01-01 PROCEDURE — 0JBQ0ZZ EXCISION OF RIGHT FOOT SUBCUTANEOUS TISSUE AND FASCIA, OPEN APPROACH: ICD-10-PCS | Performed by: PODIATRIST

## 2019-01-01 PROCEDURE — 6370000000 HC RX 637 (ALT 250 FOR IP): Performed by: NURSE PRACTITIONER

## 2019-01-01 PROCEDURE — 73610 X-RAY EXAM OF ANKLE: CPT

## 2019-01-01 PROCEDURE — 86140 C-REACTIVE PROTEIN: CPT

## 2019-01-01 PROCEDURE — 80202 ASSAY OF VANCOMYCIN: CPT

## 2019-01-01 PROCEDURE — 73721 MRI JNT OF LWR EXTRE W/O DYE: CPT

## 2019-01-01 PROCEDURE — 99284 EMERGENCY DEPT VISIT MOD MDM: CPT

## 2019-01-01 PROCEDURE — 6370000000 HC RX 637 (ALT 250 FOR IP): Performed by: EMERGENCY MEDICINE

## 2019-01-01 PROCEDURE — 85014 HEMATOCRIT: CPT

## 2019-01-01 PROCEDURE — 96360 HYDRATION IV INFUSION INIT: CPT

## 2019-01-01 PROCEDURE — 87324 CLOSTRIDIUM AG IA: CPT

## 2019-01-01 PROCEDURE — 83880 ASSAY OF NATRIURETIC PEPTIDE: CPT

## 2019-01-01 PROCEDURE — 99223 1ST HOSP IP/OBS HIGH 75: CPT | Performed by: INTERNAL MEDICINE

## 2019-01-01 PROCEDURE — 84145 PROCALCITONIN (PCT): CPT

## 2019-01-01 PROCEDURE — 84134 ASSAY OF PREALBUMIN: CPT

## 2019-01-01 PROCEDURE — 87205 SMEAR GRAM STAIN: CPT

## 2019-01-01 PROCEDURE — 99222 1ST HOSP IP/OBS MODERATE 55: CPT | Performed by: INTERNAL MEDICINE

## 2019-01-01 PROCEDURE — 87040 BLOOD CULTURE FOR BACTERIA: CPT

## 2019-01-01 PROCEDURE — 97162 PT EVAL MOD COMPLEX 30 MIN: CPT

## 2019-01-01 PROCEDURE — 96374 THER/PROPH/DIAG INJ IV PUSH: CPT

## 2019-01-01 PROCEDURE — 96365 THER/PROPH/DIAG IV INF INIT: CPT

## 2019-01-01 PROCEDURE — 87186 SC STD MICRODIL/AGAR DIL: CPT

## 2019-01-01 PROCEDURE — 99285 EMERGENCY DEPT VISIT HI MDM: CPT

## 2019-01-01 RX ORDER — NICOTINE POLACRILEX 4 MG
15 LOZENGE BUCCAL PRN
Status: DISCONTINUED | OUTPATIENT
Start: 2019-01-01 | End: 2019-01-01 | Stop reason: HOSPADM

## 2019-01-01 RX ORDER — SODIUM CHLORIDE 0.9 % (FLUSH) 0.9 %
SYRINGE (ML) INJECTION
COMMUNITY
Start: 2019-01-01

## 2019-01-01 RX ORDER — INSULIN GLARGINE 100 [IU]/ML
30 INJECTION, SOLUTION SUBCUTANEOUS 2 TIMES DAILY
Status: DISCONTINUED | OUTPATIENT
Start: 2019-01-01 | End: 2019-01-01

## 2019-01-01 RX ORDER — METRONIDAZOLE 500 MG/1
500 TABLET ORAL EVERY 8 HOURS SCHEDULED
Status: DISCONTINUED | OUTPATIENT
Start: 2019-01-01 | End: 2019-01-01

## 2019-01-01 RX ORDER — SODIUM CHLORIDE 9 MG/ML
INJECTION, SOLUTION INTRAVENOUS CONTINUOUS
Status: DISCONTINUED | OUTPATIENT
Start: 2019-01-01 | End: 2019-01-01 | Stop reason: HOSPADM

## 2019-01-01 RX ORDER — DEXTROSE MONOHYDRATE 25 G/50ML
12.5 INJECTION, SOLUTION INTRAVENOUS PRN
Status: DISCONTINUED | OUTPATIENT
Start: 2019-01-01 | End: 2019-01-01 | Stop reason: HOSPADM

## 2019-01-01 RX ORDER — VANCOMYCIN HYDROCHLORIDE 125 MG/1
CAPSULE ORAL
Refills: 0 | COMMUNITY
Start: 2019-01-01

## 2019-01-01 RX ORDER — LISINOPRIL 20 MG/1
40 TABLET ORAL DAILY
Status: DISCONTINUED | OUTPATIENT
Start: 2019-01-01 | End: 2019-01-01 | Stop reason: HOSPADM

## 2019-01-01 RX ORDER — DOCUSATE SODIUM 100 MG/1
100 CAPSULE, LIQUID FILLED ORAL 2 TIMES DAILY
Status: DISCONTINUED | OUTPATIENT
Start: 2019-01-01 | End: 2019-01-01 | Stop reason: HOSPADM

## 2019-01-01 RX ORDER — APIXABAN 5 MG/1
TABLET, FILM COATED ORAL
COMMUNITY
Start: 2019-01-01

## 2019-01-01 RX ORDER — MAGNESIUM SULFATE 1 G/100ML
1 INJECTION INTRAVENOUS PRN
Status: DISCONTINUED | OUTPATIENT
Start: 2019-01-01 | End: 2019-01-01 | Stop reason: HOSPADM

## 2019-01-01 RX ORDER — SODIUM CHLORIDE 0.9 % (FLUSH) 0.9 %
10 SYRINGE (ML) INJECTION PRN
Status: DISCONTINUED | OUTPATIENT
Start: 2019-01-01 | End: 2019-01-01 | Stop reason: HOSPADM

## 2019-01-01 RX ORDER — SODIUM CHLORIDE 0.9 % (FLUSH) 0.9 %
10 SYRINGE (ML) INJECTION EVERY 12 HOURS SCHEDULED
Status: DISCONTINUED | OUTPATIENT
Start: 2019-01-01 | End: 2019-01-01 | Stop reason: HOSPADM

## 2019-01-01 RX ORDER — CIPROFLOXACIN 500 MG/1
500 TABLET, FILM COATED ORAL EVERY 12 HOURS SCHEDULED
Status: DISCONTINUED | OUTPATIENT
Start: 2019-01-01 | End: 2019-01-01 | Stop reason: HOSPADM

## 2019-01-01 RX ORDER — DEXTROSE MONOHYDRATE 50 MG/ML
100 INJECTION, SOLUTION INTRAVENOUS PRN
Status: DISCONTINUED | OUTPATIENT
Start: 2019-01-01 | End: 2019-01-01 | Stop reason: HOSPADM

## 2019-01-01 RX ORDER — POTASSIUM CHLORIDE 20 MEQ/1
40 TABLET, EXTENDED RELEASE ORAL PRN
Status: DISCONTINUED | OUTPATIENT
Start: 2019-01-01 | End: 2019-01-01 | Stop reason: HOSPADM

## 2019-01-01 RX ORDER — DEXTROSE MONOHYDRATE 25 G/50ML
12.5 INJECTION, SOLUTION INTRAVENOUS PRN
Status: DISCONTINUED | OUTPATIENT
Start: 2019-01-01 | End: 2019-01-01 | Stop reason: SDUPTHER

## 2019-01-01 RX ORDER — ATORVASTATIN CALCIUM 80 MG/1
80 TABLET, FILM COATED ORAL NIGHTLY
Qty: 30 TABLET | Refills: 3 | Status: SHIPPED | OUTPATIENT
Start: 2019-01-01

## 2019-01-01 RX ORDER — NICOTINE POLACRILEX 4 MG
15 LOZENGE BUCCAL PRN
Status: DISCONTINUED | OUTPATIENT
Start: 2019-01-01 | End: 2019-01-01 | Stop reason: SDUPTHER

## 2019-01-01 RX ORDER — FAMOTIDINE 20 MG/1
20 TABLET, FILM COATED ORAL 2 TIMES DAILY
Status: DISCONTINUED | OUTPATIENT
Start: 2019-01-01 | End: 2019-01-01 | Stop reason: HOSPADM

## 2019-01-01 RX ORDER — BUSPIRONE HYDROCHLORIDE 7.5 MG/1
TABLET ORAL
COMMUNITY
Start: 2019-01-01

## 2019-01-01 RX ORDER — LINEZOLID 600 MG/1
600 TABLET, FILM COATED ORAL EVERY 12 HOURS SCHEDULED
Qty: 28 TABLET | Refills: 0 | Status: ON HOLD | OUTPATIENT
Start: 2019-01-01 | End: 2019-01-01

## 2019-01-01 RX ORDER — ONDANSETRON 2 MG/ML
4 INJECTION INTRAMUSCULAR; INTRAVENOUS EVERY 6 HOURS PRN
Status: DISCONTINUED | OUTPATIENT
Start: 2019-01-01 | End: 2019-01-01 | Stop reason: HOSPADM

## 2019-01-01 RX ORDER — LEVOTHYROXINE SODIUM 175 UG/1
TABLET ORAL
COMMUNITY
Start: 2019-01-01

## 2019-01-01 RX ORDER — DEXTROSE MONOHYDRATE 50 MG/ML
100 INJECTION, SOLUTION INTRAVENOUS PRN
Status: DISCONTINUED | OUTPATIENT
Start: 2019-01-01 | End: 2019-01-01 | Stop reason: SDUPTHER

## 2019-01-01 RX ORDER — FLUCONAZOLE 150 MG/1
TABLET ORAL
COMMUNITY

## 2019-01-01 RX ORDER — SODIUM CHLORIDE 9 MG/ML
INJECTION, SOLUTION INTRAVENOUS CONTINUOUS
Status: DISCONTINUED | OUTPATIENT
Start: 2019-01-01 | End: 2019-01-01

## 2019-01-01 RX ORDER — INSULIN GLARGINE 100 [IU]/ML
40 INJECTION, SOLUTION SUBCUTANEOUS 2 TIMES DAILY
Status: DISCONTINUED | OUTPATIENT
Start: 2019-01-01 | End: 2019-01-01 | Stop reason: HOSPADM

## 2019-01-01 RX ORDER — LACTOBACILLUS RHAMNOSUS GG 10B CELL
2 CAPSULE ORAL
Status: DISCONTINUED | OUTPATIENT
Start: 2019-01-01 | End: 2019-01-01 | Stop reason: HOSPADM

## 2019-01-01 RX ORDER — CIPROFLOXACIN 500 MG/1
500 TABLET, FILM COATED ORAL EVERY 12 HOURS SCHEDULED
Qty: 24 TABLET | Refills: 0 | Status: SHIPPED | OUTPATIENT
Start: 2019-01-01 | End: 2019-01-01

## 2019-01-01 RX ORDER — MAGNESIUM SULFATE 1 G/100ML
1 INJECTION INTRAVENOUS PRN
Status: DISCONTINUED | OUTPATIENT
Start: 2019-01-01 | End: 2019-01-01 | Stop reason: SDUPTHER

## 2019-01-01 RX ORDER — QUETIAPINE FUMARATE 25 MG/1
TABLET, FILM COATED ORAL
COMMUNITY
Start: 2019-01-01

## 2019-01-01 RX ORDER — ATORVASTATIN CALCIUM 80 MG/1
80 TABLET, FILM COATED ORAL NIGHTLY
Status: DISCONTINUED | OUTPATIENT
Start: 2019-01-01 | End: 2019-01-01 | Stop reason: HOSPADM

## 2019-01-01 RX ORDER — LISINOPRIL 10 MG/1
TABLET ORAL
COMMUNITY
Start: 2019-01-01

## 2019-01-01 RX ORDER — POTASSIUM CHLORIDE 20 MEQ/1
TABLET, EXTENDED RELEASE ORAL
COMMUNITY
Start: 2019-01-01

## 2019-01-01 RX ORDER — POTASSIUM CHLORIDE 20MEQ/15ML
40 LIQUID (ML) ORAL PRN
Status: DISCONTINUED | OUTPATIENT
Start: 2019-01-01 | End: 2019-01-01 | Stop reason: HOSPADM

## 2019-01-01 RX ORDER — LISINOPRIL 40 MG/1
40 TABLET ORAL DAILY
Qty: 30 TABLET | Refills: 0 | Status: SHIPPED | OUTPATIENT
Start: 2019-01-01

## 2019-01-01 RX ORDER — CLOTRIMAZOLE 1 %
CREAM (GRAM) TOPICAL
COMMUNITY

## 2019-01-01 RX ORDER — 0.9 % SODIUM CHLORIDE 0.9 %
1000 INTRAVENOUS SOLUTION INTRAVENOUS ONCE
Status: COMPLETED | OUTPATIENT
Start: 2019-01-01 | End: 2019-01-01

## 2019-01-01 RX ORDER — HYDROCHLOROTHIAZIDE 25 MG/1
TABLET ORAL
COMMUNITY

## 2019-01-01 RX ORDER — NYSTATIN 100000 [USP'U]/G
POWDER TOPICAL
COMMUNITY
Start: 2019-01-01

## 2019-01-01 RX ORDER — LEVOTHYROXINE SODIUM 137 UG/1
137 TABLET ORAL DAILY
Status: DISCONTINUED | OUTPATIENT
Start: 2019-01-01 | End: 2019-01-01 | Stop reason: HOSPADM

## 2019-01-01 RX ORDER — ACETAMINOPHEN 325 MG/1
650 TABLET ORAL EVERY 4 HOURS PRN
Status: DISCONTINUED | OUTPATIENT
Start: 2019-01-01 | End: 2019-01-01 | Stop reason: HOSPADM

## 2019-01-01 RX ORDER — INSULIN GLARGINE 100 [IU]/ML
30 INJECTION, SOLUTION SUBCUTANEOUS 2 TIMES DAILY
Status: DISCONTINUED | OUTPATIENT
Start: 2019-01-01 | End: 2019-01-01 | Stop reason: HOSPADM

## 2019-01-01 RX ORDER — LANCETS 30 GAUGE
1 EACH MISCELLANEOUS 4 TIMES DAILY
Qty: 120 EACH | Refills: 0 | Status: SHIPPED | OUTPATIENT
Start: 2019-01-01

## 2019-01-01 RX ORDER — POTASSIUM CHLORIDE 7.45 MG/ML
10 INJECTION INTRAVENOUS PRN
Status: DISCONTINUED | OUTPATIENT
Start: 2019-01-01 | End: 2019-01-01 | Stop reason: HOSPADM

## 2019-01-01 RX ORDER — VANCOMYCIN HYDROCHLORIDE 50 MG/ML
KIT ORAL
COMMUNITY
Start: 2019-01-01

## 2019-01-01 RX ORDER — LINEZOLID 600 MG/1
600 TABLET, FILM COATED ORAL EVERY 12 HOURS SCHEDULED
Qty: 28 TABLET | Refills: 0 | Status: SHIPPED | OUTPATIENT
Start: 2019-01-01 | End: 2019-01-01

## 2019-01-01 RX ORDER — LINEZOLID 600 MG/1
600 TABLET, FILM COATED ORAL EVERY 12 HOURS SCHEDULED
Status: DISCONTINUED | OUTPATIENT
Start: 2019-01-01 | End: 2019-01-01 | Stop reason: HOSPADM

## 2019-01-01 RX ORDER — LEVOTHYROXINE SODIUM 137 UG/1
137 TABLET ORAL DAILY
Qty: 30 TABLET | Refills: 0 | Status: SHIPPED | OUTPATIENT
Start: 2019-01-01

## 2019-01-01 RX ORDER — CIPROFLOXACIN 500 MG/1
500 TABLET, FILM COATED ORAL EVERY 12 HOURS SCHEDULED
Qty: 24 TABLET | Refills: 0 | Status: ON HOLD | OUTPATIENT
Start: 2019-01-01 | End: 2019-01-01

## 2019-01-01 RX ORDER — FUROSEMIDE 40 MG/1
TABLET ORAL
COMMUNITY
Start: 2019-01-01

## 2019-01-01 RX ORDER — HYDROCODONE BITARTRATE AND ACETAMINOPHEN 5; 325 MG/1; MG/1
1 TABLET ORAL EVERY 6 HOURS PRN
Status: DISCONTINUED | OUTPATIENT
Start: 2019-01-01 | End: 2019-01-01 | Stop reason: HOSPADM

## 2019-01-01 RX ORDER — SODIUM HYPOCHLORITE 1.25 MG/ML
SOLUTION TOPICAL 2 TIMES DAILY
Status: DISCONTINUED | OUTPATIENT
Start: 2019-01-01 | End: 2019-01-01 | Stop reason: HOSPADM

## 2019-01-01 RX ORDER — GLUCOSAMINE HCL/CHONDROITIN SU 500-400 MG
CAPSULE ORAL
Qty: 100 STRIP | Refills: 0 | Status: SHIPPED | OUTPATIENT
Start: 2019-01-01

## 2019-01-01 RX ORDER — INSULIN DETEMIR 100 [IU]/ML
INJECTION, SOLUTION SUBCUTANEOUS
COMMUNITY
Start: 2019-01-01

## 2019-01-01 RX ORDER — SODIUM HYPOCHLORITE 1.25 MG/ML
SOLUTION TOPICAL 2 TIMES DAILY
Qty: 1 BOTTLE | Refills: 0 | Status: SHIPPED | OUTPATIENT
Start: 2019-01-01

## 2019-01-01 RX ORDER — LINEZOLID 600 MG/1
600 TABLET, FILM COATED ORAL EVERY 12 HOURS SCHEDULED
Qty: 8 TABLET | Refills: 0 | COMMUNITY
Start: 2019-01-01

## 2019-01-01 RX ORDER — CIPROFLOXACIN 500 MG/1
500 TABLET, FILM COATED ORAL EVERY 12 HOURS SCHEDULED
Qty: 8 TABLET | Refills: 0 | COMMUNITY
Start: 2019-01-01

## 2019-01-01 RX ORDER — DIPHENHYDRAMINE HYDROCHLORIDE 50 MG/ML
25 INJECTION INTRAMUSCULAR; INTRAVENOUS EVERY 6 HOURS PRN
Status: DISCONTINUED | OUTPATIENT
Start: 2019-01-01 | End: 2019-01-01 | Stop reason: HOSPADM

## 2019-01-01 RX ORDER — SULFAMETHOXAZOLE AND TRIMETHOPRIM 800; 160 MG/1; MG/1
TABLET ORAL
COMMUNITY
Start: 2019-01-01

## 2019-01-01 RX ORDER — NICOTINE 21 MG/24HR
1 PATCH, TRANSDERMAL 24 HOURS TRANSDERMAL DAILY
Status: DISCONTINUED | OUTPATIENT
Start: 2019-01-01 | End: 2019-01-01 | Stop reason: HOSPADM

## 2019-01-01 RX ADMIN — VANCOMYCIN HYDROCHLORIDE 1000 MG: 5 INJECTION, POWDER, LYOPHILIZED, FOR SOLUTION INTRAVENOUS at 19:27

## 2019-01-01 RX ADMIN — INSULIN LISPRO 1 UNITS: 100 INJECTION, SOLUTION INTRAVENOUS; SUBCUTANEOUS at 07:34

## 2019-01-01 RX ADMIN — COLLAGENASE SANTYL: 250 OINTMENT TOPICAL at 10:09

## 2019-01-01 RX ADMIN — METOPROLOL TARTRATE 25 MG: 25 TABLET ORAL at 21:41

## 2019-01-01 RX ADMIN — INSULIN LISPRO 3 UNITS: 100 INJECTION, SOLUTION INTRAVENOUS; SUBCUTANEOUS at 20:37

## 2019-01-01 RX ADMIN — LEVOTHYROXINE SODIUM 137 MCG: 137 TABLET ORAL at 10:05

## 2019-01-01 RX ADMIN — ACETAMINOPHEN 650 MG: 325 TABLET, FILM COATED ORAL at 10:22

## 2019-01-01 RX ADMIN — LISINOPRIL 40 MG: 20 TABLET ORAL at 09:25

## 2019-01-01 RX ADMIN — LINEZOLID 600 MG: 600 TABLET, FILM COATED ORAL at 09:25

## 2019-01-01 RX ADMIN — SODIUM CHLORIDE: 9 INJECTION, SOLUTION INTRAVENOUS at 23:46

## 2019-01-01 RX ADMIN — INSULIN LISPRO 8 UNITS: 100 INJECTION, SOLUTION INTRAVENOUS; SUBCUTANEOUS at 07:45

## 2019-01-01 RX ADMIN — ACETAMINOPHEN 650 MG: 325 TABLET, FILM COATED ORAL at 00:03

## 2019-01-01 RX ADMIN — DOCUSATE SODIUM 100 MG: 100 CAPSULE, LIQUID FILLED ORAL at 21:45

## 2019-01-01 RX ADMIN — LINEZOLID 600 MG: 600 TABLET, FILM COATED ORAL at 08:30

## 2019-01-01 RX ADMIN — INSULIN LISPRO 6 UNITS: 100 INJECTION, SOLUTION INTRAVENOUS; SUBCUTANEOUS at 08:25

## 2019-01-01 RX ADMIN — LEVOTHYROXINE SODIUM 137 MCG: 137 TABLET ORAL at 22:21

## 2019-01-01 RX ADMIN — LEVOTHYROXINE SODIUM 137 MCG: 137 TABLET ORAL at 09:31

## 2019-01-01 RX ADMIN — Medication 2 CAPSULE: at 08:31

## 2019-01-01 RX ADMIN — METOPROLOL TARTRATE 25 MG: 25 TABLET ORAL at 22:46

## 2019-01-01 RX ADMIN — DAKIN'S SOLUTION 0.125% (QUARTER STRENGTH) 473 ML: 0.12 SOLUTION at 10:09

## 2019-01-01 RX ADMIN — SODIUM CHLORIDE: 9 INJECTION, SOLUTION INTRAVENOUS at 09:27

## 2019-01-01 RX ADMIN — VANCOMYCIN HYDROCHLORIDE 1000 MG: 5 INJECTION, POWDER, LYOPHILIZED, FOR SOLUTION INTRAVENOUS at 09:49

## 2019-01-01 RX ADMIN — HYDROCODONE BITARTRATE AND ACETAMINOPHEN 1 TABLET: 5; 325 TABLET ORAL at 09:06

## 2019-01-01 RX ADMIN — LINEZOLID 600 MG: 600 TABLET, FILM COATED ORAL at 11:54

## 2019-01-01 RX ADMIN — MEROPENEM 1 G: 1 INJECTION, POWDER, FOR SOLUTION INTRAVENOUS at 22:35

## 2019-01-01 RX ADMIN — FAMOTIDINE 20 MG: 20 TABLET ORAL at 21:41

## 2019-01-01 RX ADMIN — LINEZOLID 600 MG: 600 TABLET, FILM COATED ORAL at 20:49

## 2019-01-01 RX ADMIN — DAKIN'S SOLUTION 0.125% (QUARTER STRENGTH) 473 ML: 0.12 SOLUTION at 15:53

## 2019-01-01 RX ADMIN — SODIUM CHLORIDE: 9 INJECTION, SOLUTION INTRAVENOUS at 17:56

## 2019-01-01 RX ADMIN — METOPROLOL TARTRATE 25 MG: 25 TABLET ORAL at 07:52

## 2019-01-01 RX ADMIN — METRONIDAZOLE 500 MG: 500 TABLET ORAL at 05:51

## 2019-01-01 RX ADMIN — CIPROFLOXACIN HYDROCHLORIDE 500 MG: 500 TABLET, FILM COATED ORAL at 21:55

## 2019-01-01 RX ADMIN — POTASSIUM CHLORIDE 40 MEQ: 20 TABLET, EXTENDED RELEASE ORAL at 08:35

## 2019-01-01 RX ADMIN — CIPROFLOXACIN HYDROCHLORIDE 500 MG: 500 TABLET, FILM COATED ORAL at 20:49

## 2019-01-01 RX ADMIN — VANCOMYCIN HYDROCHLORIDE 1000 MG: 5 INJECTION, POWDER, LYOPHILIZED, FOR SOLUTION INTRAVENOUS at 08:45

## 2019-01-01 RX ADMIN — INSULIN GLARGINE 40 UNITS: 100 INJECTION, SOLUTION SUBCUTANEOUS at 09:25

## 2019-01-01 RX ADMIN — DAKIN'S SOLUTION 0.125% (QUARTER STRENGTH) 473 ML: 0.12 SOLUTION at 21:56

## 2019-01-01 RX ADMIN — METRONIDAZOLE 500 MG: 500 TABLET ORAL at 22:45

## 2019-01-01 RX ADMIN — Medication 10 ML: at 22:29

## 2019-01-01 RX ADMIN — FAMOTIDINE 20 MG: 20 TABLET ORAL at 09:27

## 2019-01-01 RX ADMIN — MEROPENEM 1 G: 1 INJECTION, POWDER, FOR SOLUTION INTRAVENOUS at 14:59

## 2019-01-01 RX ADMIN — DAKIN'S SOLUTION 0.125% (QUARTER STRENGTH) 473 ML: 0.12 SOLUTION at 11:45

## 2019-01-01 RX ADMIN — INSULIN GLARGINE 30 UNITS: 100 INJECTION, SOLUTION SUBCUTANEOUS at 21:55

## 2019-01-01 RX ADMIN — DOCUSATE SODIUM 100 MG: 100 CAPSULE, LIQUID FILLED ORAL at 08:36

## 2019-01-01 RX ADMIN — DOCUSATE SODIUM 100 MG: 100 CAPSULE, LIQUID FILLED ORAL at 08:24

## 2019-01-01 RX ADMIN — COLLAGENASE SANTYL: 250 OINTMENT TOPICAL at 14:57

## 2019-01-01 RX ADMIN — SODIUM CHLORIDE 1000 ML: 9 INJECTION, SOLUTION INTRAVENOUS at 15:49

## 2019-01-01 RX ADMIN — SODIUM CHLORIDE: 9 INJECTION, SOLUTION INTRAVENOUS at 21:09

## 2019-01-01 RX ADMIN — INSULIN LISPRO 12 UNITS: 100 INJECTION, SOLUTION INTRAVENOUS; SUBCUTANEOUS at 11:40

## 2019-01-01 RX ADMIN — ENOXAPARIN SODIUM 40 MG: 100 INJECTION SUBCUTANEOUS at 09:26

## 2019-01-01 RX ADMIN — CEFEPIME 2 G: 2 INJECTION, POWDER, FOR SOLUTION INTRAVENOUS at 09:02

## 2019-01-01 RX ADMIN — FAMOTIDINE 20 MG: 20 TABLET ORAL at 22:20

## 2019-01-01 RX ADMIN — SODIUM CHLORIDE: 9 INJECTION, SOLUTION INTRAVENOUS at 04:59

## 2019-01-01 RX ADMIN — SODIUM CHLORIDE: 9 INJECTION, SOLUTION INTRAVENOUS at 15:56

## 2019-01-01 RX ADMIN — MEROPENEM 1 G: 1 INJECTION, POWDER, FOR SOLUTION INTRAVENOUS at 17:11

## 2019-01-01 RX ADMIN — LEVOTHYROXINE SODIUM 137 MCG: 137 TABLET ORAL at 08:52

## 2019-01-01 RX ADMIN — LISINOPRIL 40 MG: 20 TABLET ORAL at 22:38

## 2019-01-01 RX ADMIN — COLLAGENASE SANTYL: 250 OINTMENT TOPICAL at 09:56

## 2019-01-01 RX ADMIN — METFORMIN HYDROCHLORIDE 1000 MG: 1000 TABLET, FILM COATED ORAL at 07:53

## 2019-01-01 RX ADMIN — LEVOTHYROXINE SODIUM 137 MCG: 137 TABLET ORAL at 10:22

## 2019-01-01 RX ADMIN — INSULIN GLARGINE 30 UNITS: 100 INJECTION, SOLUTION SUBCUTANEOUS at 07:45

## 2019-01-01 RX ADMIN — COLLAGENASE SANTYL: 250 OINTMENT TOPICAL at 08:25

## 2019-01-01 RX ADMIN — INSULIN LISPRO 2 UNITS: 100 INJECTION, SOLUTION INTRAVENOUS; SUBCUTANEOUS at 02:00

## 2019-01-01 RX ADMIN — METOPROLOL TARTRATE 25 MG: 25 TABLET ORAL at 08:31

## 2019-01-01 RX ADMIN — LISINOPRIL 40 MG: 20 TABLET ORAL at 09:27

## 2019-01-01 RX ADMIN — INSULIN GLARGINE 30 UNITS: 100 INJECTION, SOLUTION SUBCUTANEOUS at 21:32

## 2019-01-01 RX ADMIN — INSULIN LISPRO 5 UNITS: 100 INJECTION, SOLUTION INTRAVENOUS; SUBCUTANEOUS at 21:41

## 2019-01-01 RX ADMIN — ENOXAPARIN SODIUM 40 MG: 100 INJECTION SUBCUTANEOUS at 08:56

## 2019-01-01 RX ADMIN — INSULIN LISPRO 6 UNITS: 100 INJECTION, SOLUTION INTRAVENOUS; SUBCUTANEOUS at 11:54

## 2019-01-01 RX ADMIN — INSULIN LISPRO 3 UNITS: 100 INJECTION, SOLUTION INTRAVENOUS; SUBCUTANEOUS at 17:18

## 2019-01-01 RX ADMIN — CIPROFLOXACIN HYDROCHLORIDE 500 MG: 500 TABLET, FILM COATED ORAL at 08:30

## 2019-01-01 RX ADMIN — HYDROCODONE BITARTRATE AND ACETAMINOPHEN 1 TABLET: 5; 325 TABLET ORAL at 00:55

## 2019-01-01 RX ADMIN — INSULIN GLARGINE 30 UNITS: 100 INJECTION, SOLUTION SUBCUTANEOUS at 08:26

## 2019-01-01 RX ADMIN — ACETAMINOPHEN 650 MG: 325 TABLET, FILM COATED ORAL at 03:09

## 2019-01-01 RX ADMIN — INSULIN GLARGINE 30 UNITS: 100 INJECTION, SOLUTION SUBCUTANEOUS at 08:31

## 2019-01-01 RX ADMIN — INSULIN LISPRO 3 UNITS: 100 INJECTION, SOLUTION INTRAVENOUS; SUBCUTANEOUS at 22:03

## 2019-01-01 RX ADMIN — INSULIN LISPRO 3 UNITS: 100 INJECTION, SOLUTION INTRAVENOUS; SUBCUTANEOUS at 22:22

## 2019-01-01 RX ADMIN — ENOXAPARIN SODIUM 40 MG: 100 INJECTION SUBCUTANEOUS at 22:22

## 2019-01-01 RX ADMIN — INSULIN GLARGINE 40 UNITS: 100 INJECTION, SOLUTION SUBCUTANEOUS at 20:50

## 2019-01-01 RX ADMIN — DAKIN'S SOLUTION 0.125% (QUARTER STRENGTH) 473 ML: 0.12 SOLUTION at 08:50

## 2019-01-01 RX ADMIN — METOPROLOL TARTRATE 25 MG: 25 TABLET ORAL at 09:25

## 2019-01-01 RX ADMIN — METRONIDAZOLE 500 MG: 500 TABLET ORAL at 21:32

## 2019-01-01 RX ADMIN — FAMOTIDINE 20 MG: 20 TABLET ORAL at 08:56

## 2019-01-01 RX ADMIN — INSULIN LISPRO 3 UNITS: 100 INJECTION, SOLUTION INTRAVENOUS; SUBCUTANEOUS at 12:02

## 2019-01-01 RX ADMIN — VANCOMYCIN 1000 MG: 1 INJECTION, SOLUTION INTRAVENOUS at 04:15

## 2019-01-01 RX ADMIN — METOPROLOL TARTRATE 25 MG: 25 TABLET ORAL at 22:20

## 2019-01-01 RX ADMIN — MEROPENEM 1 G: 1 INJECTION, POWDER, FOR SOLUTION INTRAVENOUS at 01:02

## 2019-01-01 RX ADMIN — ONDANSETRON 4 MG: 2 INJECTION INTRAMUSCULAR; INTRAVENOUS at 18:36

## 2019-01-01 RX ADMIN — Medication 2 CAPSULE: at 12:02

## 2019-01-01 RX ADMIN — LINEZOLID 600 MG: 600 TABLET, FILM COATED ORAL at 08:31

## 2019-01-01 RX ADMIN — INSULIN GLARGINE 30 UNITS: 100 INJECTION, SOLUTION SUBCUTANEOUS at 07:56

## 2019-01-01 RX ADMIN — INSULIN GLARGINE 30 UNITS: 100 INJECTION, SOLUTION SUBCUTANEOUS at 09:28

## 2019-01-01 RX ADMIN — VANCOMYCIN HYDROCHLORIDE 125 MG: KIT at 01:29

## 2019-01-01 RX ADMIN — SODIUM CHLORIDE: 9 INJECTION, SOLUTION INTRAVENOUS at 05:23

## 2019-01-01 RX ADMIN — INSULIN GLARGINE 30 UNITS: 100 INJECTION, SOLUTION SUBCUTANEOUS at 22:03

## 2019-01-01 RX ADMIN — ENOXAPARIN SODIUM 40 MG: 100 INJECTION SUBCUTANEOUS at 09:25

## 2019-01-01 RX ADMIN — DAKIN'S SOLUTION 0.125% (QUARTER STRENGTH) 473 ML: 0.12 SOLUTION at 20:38

## 2019-01-01 RX ADMIN — METOPROLOL TARTRATE 25 MG: 25 TABLET ORAL at 20:38

## 2019-01-01 RX ADMIN — METOPROLOL TARTRATE 25 MG: 25 TABLET ORAL at 07:57

## 2019-01-01 RX ADMIN — INSULIN LISPRO 3 UNITS: 100 INJECTION, SOLUTION INTRAVENOUS; SUBCUTANEOUS at 11:54

## 2019-01-01 RX ADMIN — ENOXAPARIN SODIUM 40 MG: 100 INJECTION SUBCUTANEOUS at 09:03

## 2019-01-01 RX ADMIN — CEFEPIME 2 G: 2 INJECTION, POWDER, FOR SOLUTION INTRAVENOUS at 20:13

## 2019-01-01 RX ADMIN — MEROPENEM 1 G: 1 INJECTION, POWDER, FOR SOLUTION INTRAVENOUS at 14:22

## 2019-01-01 RX ADMIN — INSULIN LISPRO 6 UNITS: 100 INJECTION, SOLUTION INTRAVENOUS; SUBCUTANEOUS at 08:31

## 2019-01-01 RX ADMIN — INSULIN LISPRO 4 UNITS: 100 INJECTION, SOLUTION INTRAVENOUS; SUBCUTANEOUS at 04:00

## 2019-01-01 RX ADMIN — METOPROLOL TARTRATE 25 MG: 25 TABLET ORAL at 09:06

## 2019-01-01 RX ADMIN — METRONIDAZOLE 500 MG: 500 TABLET ORAL at 14:56

## 2019-01-01 RX ADMIN — LISINOPRIL 40 MG: 20 TABLET ORAL at 08:56

## 2019-01-01 RX ADMIN — INSULIN LISPRO 4 UNITS: 100 INJECTION, SOLUTION INTRAVENOUS; SUBCUTANEOUS at 12:02

## 2019-01-01 RX ADMIN — INSULIN GLARGINE 40 UNITS: 100 INJECTION, SOLUTION SUBCUTANEOUS at 20:17

## 2019-01-01 RX ADMIN — COLLAGENASE SANTYL: 250 OINTMENT TOPICAL at 09:26

## 2019-01-01 RX ADMIN — MEROPENEM 1 G: 1 INJECTION, POWDER, FOR SOLUTION INTRAVENOUS at 05:52

## 2019-01-01 RX ADMIN — LEVOTHYROXINE SODIUM 137 MCG: 137 TABLET ORAL at 08:41

## 2019-01-01 RX ADMIN — MAGNESIUM SULFATE HEPTAHYDRATE 1 G: 1 INJECTION, SOLUTION INTRAVENOUS at 16:31

## 2019-01-01 RX ADMIN — INSULIN GLARGINE 30 UNITS: 100 INJECTION, SOLUTION SUBCUTANEOUS at 08:37

## 2019-01-01 RX ADMIN — LEVOTHYROXINE SODIUM 137 MCG: 137 TABLET ORAL at 07:53

## 2019-01-01 RX ADMIN — DOCUSATE SODIUM 100 MG: 100 CAPSULE, LIQUID FILLED ORAL at 07:57

## 2019-01-01 RX ADMIN — LEVOTHYROXINE SODIUM 137 MCG: 137 TABLET ORAL at 08:34

## 2019-01-01 RX ADMIN — CIPROFLOXACIN HYDROCHLORIDE 500 MG: 500 TABLET, FILM COATED ORAL at 08:31

## 2019-01-01 RX ADMIN — COLLAGENASE SANTYL: 250 OINTMENT TOPICAL at 08:50

## 2019-01-01 RX ADMIN — INSULIN LISPRO 2 UNITS: 100 INJECTION, SOLUTION INTRAVENOUS; SUBCUTANEOUS at 21:46

## 2019-01-01 RX ADMIN — FAMOTIDINE 20 MG: 20 TABLET ORAL at 07:57

## 2019-01-01 RX ADMIN — INSULIN LISPRO 3 UNITS: 100 INJECTION, SOLUTION INTRAVENOUS; SUBCUTANEOUS at 09:04

## 2019-01-01 RX ADMIN — ENOXAPARIN SODIUM 40 MG: 100 INJECTION SUBCUTANEOUS at 07:54

## 2019-01-01 RX ADMIN — CIPROFLOXACIN HYDROCHLORIDE 500 MG: 500 TABLET, FILM COATED ORAL at 11:55

## 2019-01-01 RX ADMIN — FAMOTIDINE 20 MG: 20 TABLET ORAL at 21:45

## 2019-01-01 RX ADMIN — VANCOMYCIN HYDROCHLORIDE 1250 MG: 5 INJECTION, POWDER, LYOPHILIZED, FOR SOLUTION INTRAVENOUS at 15:49

## 2019-01-01 RX ADMIN — HYDROCODONE BITARTRATE AND ACETAMINOPHEN 1 TABLET: 5; 325 TABLET ORAL at 08:56

## 2019-01-01 RX ADMIN — METRONIDAZOLE 500 MG: 500 TABLET ORAL at 05:34

## 2019-01-01 RX ADMIN — LEVOTHYROXINE SODIUM 137 MCG: 137 TABLET ORAL at 08:24

## 2019-01-01 RX ADMIN — ENOXAPARIN SODIUM 40 MG: 100 INJECTION SUBCUTANEOUS at 08:31

## 2019-01-01 RX ADMIN — MEROPENEM 1 G: 1 INJECTION, POWDER, FOR SOLUTION INTRAVENOUS at 00:53

## 2019-01-01 RX ADMIN — INSULIN GLARGINE 30 UNITS: 100 INJECTION, SOLUTION SUBCUTANEOUS at 21:46

## 2019-01-01 RX ADMIN — DAKIN'S SOLUTION 0.125% (QUARTER STRENGTH) 473 ML: 0.12 SOLUTION at 22:02

## 2019-01-01 RX ADMIN — LISINOPRIL 40 MG: 20 TABLET ORAL at 08:45

## 2019-01-01 RX ADMIN — METOPROLOL TARTRATE 25 MG: 25 TABLET ORAL at 20:15

## 2019-01-01 RX ADMIN — INSULIN HUMAN 10 UNITS: 100 INJECTION, SOLUTION PARENTERAL at 16:35

## 2019-01-01 RX ADMIN — MEROPENEM 1 G: 1 INJECTION, POWDER, FOR SOLUTION INTRAVENOUS at 08:49

## 2019-01-01 RX ADMIN — METOPROLOL TARTRATE 25 MG: 25 TABLET ORAL at 08:24

## 2019-01-01 RX ADMIN — CIPROFLOXACIN HYDROCHLORIDE 500 MG: 500 TABLET, FILM COATED ORAL at 07:53

## 2019-01-01 RX ADMIN — SODIUM CHLORIDE: 9 INJECTION, SOLUTION INTRAVENOUS at 22:28

## 2019-01-01 RX ADMIN — ATORVASTATIN CALCIUM 80 MG: 80 TABLET, FILM COATED ORAL at 20:49

## 2019-01-01 RX ADMIN — LISINOPRIL 40 MG: 20 TABLET ORAL at 08:07

## 2019-01-01 RX ADMIN — Medication 2 CAPSULE: at 07:35

## 2019-01-01 RX ADMIN — LEVOTHYROXINE SODIUM 137 MCG: 137 TABLET ORAL at 08:30

## 2019-01-01 RX ADMIN — INSULIN GLARGINE 30 UNITS: 100 INJECTION, SOLUTION SUBCUTANEOUS at 09:04

## 2019-01-01 RX ADMIN — METOPROLOL TARTRATE 25 MG: 25 TABLET ORAL at 09:27

## 2019-01-01 RX ADMIN — SODIUM CHLORIDE: 9 INJECTION, SOLUTION INTRAVENOUS at 03:11

## 2019-01-01 RX ADMIN — Medication 2 CAPSULE: at 17:50

## 2019-01-01 RX ADMIN — INSULIN LISPRO 12 UNITS: 100 INJECTION, SOLUTION INTRAVENOUS; SUBCUTANEOUS at 08:37

## 2019-01-01 RX ADMIN — VANCOMYCIN 1000 MG: 1 INJECTION, SOLUTION INTRAVENOUS at 17:57

## 2019-01-01 RX ADMIN — MEROPENEM 1 G: 1 INJECTION, POWDER, FOR SOLUTION INTRAVENOUS at 17:19

## 2019-01-01 RX ADMIN — SODIUM CHLORIDE 1000 ML: 9 INJECTION, SOLUTION INTRAVENOUS at 00:37

## 2019-01-01 RX ADMIN — SODIUM CHLORIDE: 9 INJECTION, SOLUTION INTRAVENOUS at 19:32

## 2019-01-01 RX ADMIN — LINEZOLID 600 MG: 600 TABLET, FILM COATED ORAL at 09:00

## 2019-01-01 RX ADMIN — INSULIN LISPRO 3 UNITS: 100 INJECTION, SOLUTION INTRAVENOUS; SUBCUTANEOUS at 08:45

## 2019-01-01 RX ADMIN — LISINOPRIL 40 MG: 20 TABLET ORAL at 08:31

## 2019-01-01 RX ADMIN — SODIUM CHLORIDE 1000 ML: 9 INJECTION, SOLUTION INTRAVENOUS at 17:41

## 2019-01-01 RX ADMIN — FAMOTIDINE 20 MG: 20 TABLET ORAL at 08:36

## 2019-01-01 RX ADMIN — SODIUM CHLORIDE: 9 INJECTION, SOLUTION INTRAVENOUS at 06:44

## 2019-01-01 RX ADMIN — COLLAGENASE SANTYL: 250 OINTMENT TOPICAL at 15:53

## 2019-01-01 RX ADMIN — VANCOMYCIN 1000 MG: 1 INJECTION, SOLUTION INTRAVENOUS at 19:08

## 2019-01-01 RX ADMIN — METOPROLOL TARTRATE 25 MG: 25 TABLET ORAL at 08:36

## 2019-01-01 RX ADMIN — ACETAMINOPHEN 650 MG: 325 TABLET, FILM COATED ORAL at 09:41

## 2019-01-01 RX ADMIN — DAKIN'S SOLUTION 0.125% (QUARTER STRENGTH) 473 ML: 0.12 SOLUTION at 22:54

## 2019-01-01 RX ADMIN — LINEZOLID 600 MG: 600 TABLET, FILM COATED ORAL at 21:55

## 2019-01-01 RX ADMIN — VANCOMYCIN HYDROCHLORIDE 1250 MG: 5 INJECTION, POWDER, LYOPHILIZED, FOR SOLUTION INTRAVENOUS at 04:46

## 2019-01-01 RX ADMIN — LISINOPRIL 40 MG: 20 TABLET ORAL at 07:53

## 2019-01-01 RX ADMIN — METOPROLOL TARTRATE 25 MG: 25 TABLET ORAL at 21:55

## 2019-01-01 RX ADMIN — CIPROFLOXACIN HYDROCHLORIDE 500 MG: 500 TABLET, FILM COATED ORAL at 09:25

## 2019-01-01 RX ADMIN — INSULIN LISPRO 3 UNITS: 100 INJECTION, SOLUTION INTRAVENOUS; SUBCUTANEOUS at 17:33

## 2019-01-01 RX ADMIN — MEROPENEM 1 G: 1 INJECTION, POWDER, FOR SOLUTION INTRAVENOUS at 06:23

## 2019-01-01 RX ADMIN — INSULIN LISPRO 6 UNITS: 100 INJECTION, SOLUTION INTRAVENOUS; SUBCUTANEOUS at 16:56

## 2019-01-01 RX ADMIN — INSULIN GLARGINE 30 UNITS: 100 INJECTION, SOLUTION SUBCUTANEOUS at 08:45

## 2019-01-01 RX ADMIN — METOPROLOL TARTRATE 25 MG: 25 TABLET ORAL at 08:45

## 2019-01-01 RX ADMIN — Medication 2 CAPSULE: at 11:48

## 2019-01-01 RX ADMIN — INSULIN GLARGINE 30 UNITS: 100 INJECTION, SOLUTION SUBCUTANEOUS at 21:40

## 2019-01-01 RX ADMIN — MEROPENEM 1 G: 1 INJECTION, POWDER, FOR SOLUTION INTRAVENOUS at 07:56

## 2019-01-01 RX ADMIN — COLLAGENASE SANTYL: 250 OINTMENT TOPICAL at 11:18

## 2019-01-01 RX ADMIN — ATORVASTATIN CALCIUM 80 MG: 80 TABLET, FILM COATED ORAL at 20:16

## 2019-01-01 RX ADMIN — Medication 2 CAPSULE: at 17:04

## 2019-01-01 RX ADMIN — SODIUM CHLORIDE: 9 INJECTION, SOLUTION INTRAVENOUS at 16:32

## 2019-01-01 RX ADMIN — LINEZOLID 600 MG: 600 TABLET, FILM COATED ORAL at 20:16

## 2019-01-01 RX ADMIN — ACETAMINOPHEN 650 MG: 325 TABLET, FILM COATED ORAL at 18:58

## 2019-01-01 RX ADMIN — INSULIN LISPRO 6 UNITS: 100 INJECTION, SOLUTION INTRAVENOUS; SUBCUTANEOUS at 14:58

## 2019-01-01 RX ADMIN — INSULIN LISPRO 6 UNITS: 100 INJECTION, SOLUTION INTRAVENOUS; SUBCUTANEOUS at 03:11

## 2019-01-01 RX ADMIN — VANCOMYCIN HYDROCHLORIDE 1000 MG: 5 INJECTION, POWDER, LYOPHILIZED, FOR SOLUTION INTRAVENOUS at 18:32

## 2019-01-01 RX ADMIN — METOPROLOL TARTRATE 25 MG: 25 TABLET ORAL at 22:02

## 2019-01-01 RX ADMIN — METOPROLOL TARTRATE 25 MG: 25 TABLET ORAL at 21:45

## 2019-01-01 RX ADMIN — INSULIN GLARGINE 30 UNITS: 100 INJECTION, SOLUTION SUBCUTANEOUS at 22:22

## 2019-01-01 RX ADMIN — MEROPENEM 1 G: 1 INJECTION, POWDER, FOR SOLUTION INTRAVENOUS at 21:45

## 2019-01-01 RX ADMIN — ENOXAPARIN SODIUM 40 MG: 100 INJECTION SUBCUTANEOUS at 08:44

## 2019-01-01 RX ADMIN — SODIUM CHLORIDE: 9 INJECTION, SOLUTION INTRAVENOUS at 15:31

## 2019-01-01 RX ADMIN — INSULIN LISPRO 6 UNITS: 100 INJECTION, SOLUTION INTRAVENOUS; SUBCUTANEOUS at 13:06

## 2019-01-01 RX ADMIN — SODIUM CHLORIDE: 9 INJECTION, SOLUTION INTRAVENOUS at 04:01

## 2019-01-01 RX ADMIN — LISINOPRIL 40 MG: 20 TABLET ORAL at 08:24

## 2019-01-01 RX ADMIN — MAGNESIUM SULFATE HEPTAHYDRATE 1 G: 1 INJECTION, SOLUTION INTRAVENOUS at 18:58

## 2019-01-01 RX ADMIN — METOPROLOL TARTRATE 25 MG: 25 TABLET ORAL at 20:16

## 2019-01-01 RX ADMIN — FAMOTIDINE 20 MG: 20 TABLET ORAL at 09:03

## 2019-01-01 RX ADMIN — LISINOPRIL 40 MG: 20 TABLET ORAL at 08:30

## 2019-01-01 RX ADMIN — DAKIN'S SOLUTION 0.125% (QUARTER STRENGTH) 473 ML: 0.12 SOLUTION at 11:18

## 2019-01-01 RX ADMIN — FAMOTIDINE 20 MG: 20 TABLET ORAL at 08:24

## 2019-01-01 RX ADMIN — METOPROLOL TARTRATE 25 MG: 25 TABLET ORAL at 20:49

## 2019-01-01 RX ADMIN — INSULIN LISPRO 3 UNITS: 100 INJECTION, SOLUTION INTRAVENOUS; SUBCUTANEOUS at 12:06

## 2019-01-01 RX ADMIN — FAMOTIDINE 20 MG: 20 TABLET ORAL at 20:37

## 2019-01-01 RX ADMIN — INSULIN LISPRO 5 UNITS: 100 INJECTION, SOLUTION INTRAVENOUS; SUBCUTANEOUS at 01:48

## 2019-01-01 RX ADMIN — INSULIN LISPRO 6 UNITS: 100 INJECTION, SOLUTION INTRAVENOUS; SUBCUTANEOUS at 17:04

## 2019-01-01 RX ADMIN — INSULIN LISPRO 6 UNITS: 100 INJECTION, SOLUTION INTRAVENOUS; SUBCUTANEOUS at 07:56

## 2019-01-01 RX ADMIN — LISINOPRIL 40 MG: 20 TABLET ORAL at 08:36

## 2019-01-01 RX ADMIN — INSULIN GLARGINE 30 UNITS: 100 INJECTION, SOLUTION SUBCUTANEOUS at 20:37

## 2019-01-01 RX ADMIN — LISINOPRIL 40 MG: 20 TABLET ORAL at 09:06

## 2019-01-01 RX ADMIN — SODIUM CHLORIDE: 9 INJECTION, SOLUTION INTRAVENOUS at 14:51

## 2019-01-01 RX ADMIN — METOPROLOL TARTRATE 25 MG: 25 TABLET ORAL at 08:56

## 2019-01-01 RX ADMIN — LEVOTHYROXINE SODIUM 137 MCG: 137 TABLET ORAL at 08:56

## 2019-01-01 RX ADMIN — FAMOTIDINE 20 MG: 20 TABLET ORAL at 08:45

## 2019-01-01 RX ADMIN — FAMOTIDINE 20 MG: 20 TABLET ORAL at 20:16

## 2019-01-01 RX ADMIN — DAKIN'S SOLUTION 0.125% (QUARTER STRENGTH) 473 ML: 0.12 SOLUTION at 14:59

## 2019-01-01 RX ADMIN — CIPROFLOXACIN HYDROCHLORIDE 500 MG: 500 TABLET, FILM COATED ORAL at 20:16

## 2019-01-01 RX ADMIN — INSULIN LISPRO 3 UNITS: 100 INJECTION, SOLUTION INTRAVENOUS; SUBCUTANEOUS at 21:55

## 2019-01-01 ASSESSMENT — PAIN SCALES - GENERAL
PAINLEVEL_OUTOF10: 0
PAINLEVEL_OUTOF10: 7
PAINLEVEL_OUTOF10: 5
PAINLEVEL_OUTOF10: 6
PAINLEVEL_OUTOF10: 0
PAINLEVEL_OUTOF10: 5
PAINLEVEL_OUTOF10: 0
PAINLEVEL_OUTOF10: 6
PAINLEVEL_OUTOF10: 6
PAINLEVEL_OUTOF10: 5
PAINLEVEL_OUTOF10: 0
PAINLEVEL_OUTOF10: 0
PAINLEVEL_OUTOF10: 5
PAINLEVEL_OUTOF10: 6
PAINLEVEL_OUTOF10: 0
PAINLEVEL_OUTOF10: 6
PAINLEVEL_OUTOF10: 6
PAINLEVEL_OUTOF10: 0
PAINLEVEL_OUTOF10: 6
PAINLEVEL_OUTOF10: 6
PAINLEVEL_OUTOF10: 0

## 2019-01-01 ASSESSMENT — ENCOUNTER SYMPTOMS
ANAL BLEEDING: 0
RESPIRATORY NEGATIVE: 1
VOMITING: 0
WHEEZING: 0
BLOOD IN STOOL: 0
ABDOMINAL PAIN: 0
ABDOMINAL DISTENTION: 0
NAUSEA: 1
ABDOMINAL PAIN: 0
BACK PAIN: 1
EYES NEGATIVE: 1
DIARRHEA: 1
NAUSEA: 0
DIARRHEA: 1
VOMITING: 0
COLOR CHANGE: 1
COLOR CHANGE: 0
EYE DISCHARGE: 1
PHOTOPHOBIA: 0
VOMITING: 0
NAUSEA: 0
EYE DISCHARGE: 1
DIARRHEA: 1
SHORTNESS OF BREATH: 0
NAUSEA: 0
DIARRHEA: 1
ABDOMINAL PAIN: 0
NAUSEA: 0
COUGH: 0
VOMITING: 0
CONSTIPATION: 0
EYE DISCHARGE: 1
DIARRHEA: 1
SHORTNESS OF BREATH: 0
SORE THROAT: 0
RECTAL PAIN: 0
VOMITING: 0
CHEST TIGHTNESS: 0
SHORTNESS OF BREATH: 0
SHORTNESS OF BREATH: 0

## 2019-01-01 ASSESSMENT — PAIN DESCRIPTION - ONSET
ONSET: ON-GOING
ONSET: ON-GOING

## 2019-01-01 ASSESSMENT — PAIN DESCRIPTION - DESCRIPTORS
DESCRIPTORS: DISCOMFORT
DESCRIPTORS: DISCOMFORT
DESCRIPTORS: ACHING

## 2019-01-01 ASSESSMENT — PAIN DESCRIPTION - ORIENTATION
ORIENTATION: RIGHT
ORIENTATION: RIGHT;LEFT
ORIENTATION: RIGHT
ORIENTATION: RIGHT

## 2019-01-01 ASSESSMENT — PAIN DESCRIPTION - LOCATION
LOCATION: FOOT
LOCATION: FOOT
LOCATION: LEG
LOCATION: LEG
LOCATION: FOOT
LOCATION: LEG
LOCATION: LEG

## 2019-01-01 ASSESSMENT — PAIN - FUNCTIONAL ASSESSMENT: PAIN_FUNCTIONAL_ASSESSMENT: PREVENTS OR INTERFERES SOME ACTIVE ACTIVITIES AND ADLS

## 2019-01-01 ASSESSMENT — PAIN DESCRIPTION - PAIN TYPE
TYPE: ACUTE PAIN;CHRONIC PAIN
TYPE: ACUTE PAIN
TYPE: OTHER (COMMENT)
TYPE: ACUTE PAIN
TYPE: ACUTE PAIN

## 2019-01-01 ASSESSMENT — PAIN DESCRIPTION - FREQUENCY
FREQUENCY: INTERMITTENT

## 2019-01-01 ASSESSMENT — PAIN DESCRIPTION - PROGRESSION: CLINICAL_PROGRESSION: NOT CHANGED

## 2019-01-23 PROBLEM — L97.309 DIABETIC ULCER OF ANKLE (HCC): Status: ACTIVE | Noted: 2019-01-01

## 2019-01-23 PROBLEM — E11.622 DIABETIC ULCER OF ANKLE (HCC): Status: ACTIVE | Noted: 2019-01-01

## 2019-01-24 PROBLEM — E11.65 POORLY CONTROLLED DIABETES MELLITUS (HCC): Status: ACTIVE | Noted: 2019-01-01

## 2019-01-25 PROBLEM — E43 SEVERE MALNUTRITION (HCC): Status: ACTIVE | Noted: 2019-01-01

## 2019-02-04 PROBLEM — R19.7 DIARRHEA: Status: ACTIVE | Noted: 2019-01-01

## 2019-02-04 PROBLEM — E44.0 MODERATE MALNUTRITION (HCC): Status: ACTIVE | Noted: 2019-01-01

## 2019-05-20 NOTE — PROGRESS NOTES
[facility]   Office Consult Note  Today's Date and Time: 5/20/2019, 2:49 PM    Impression:     1. Clostridium difficile colitis    2. Open wound of right lower leg, subsequent encounter    3. Bilateral lower extremity edema         Recommendations   · The patient has been treated appropriately with oral vancomycin therapy and completed a 10 day course on 5/16/19  · The patient currently is not having any diarrhea reports only having 2 bowel movements daily  · The prior open wound in the right calcaneal area has healed and there is a small draining wound at the lower portion of the right leg for which I recommended continued local wound care  · The patient does have evidence of significant soft tissue swelling on the abdominal wall and lower extremities bilaterally but left worse than the right. · The concern at this point in time would be for a blood clot and I have ordered venous Dopplers  · The patient was previously seen by Dr. Klever Mo at [facility] and can be followed up with her if any further infectious disease needs arise. I have ordered the following medications/ labs:  Orders Placed This Encounter   Procedures    US DOPPLER VENOUS LEGS B/L     Standing Status:   Future     Standing Expiration Date:   7/19/2019      No orders of the defined types were placed in this encounter. Chief complaint/reason for consultation:     Chief Complaint   Patient presents with    Frequent Infections     New Patient        History of Present Illness:   Lee Patel is a 52y.o.-year-old male who is seen at there request of MD Denita Carnesgarrick Laugeri is seen and evaluated in the office and was sent in as a new patient for Clostridium difficile infection.   The patient was previously seen by Dr. Klever Mo in our group at [facility].  The patient has a complicated history that includes diabetes mellitus type II, hypertension, hyperlipidemia, hypothyroidism, obstructive sleep apnea, cervical spine stenosis, combined systolic and diastolic heart failure among other medical problems. The patient was seen for a right heel wound that was infected and he was treated with ciprofloxacin and linezolid. The patient was discharged to an extended care facility [facility] and he was there for sometime and recently was transferred to [facility] where he reports his been there for a month. The patient is a poor historian and were able to gather from [facility] the extended care facility that he has had recurrent episodes of C. difficile infection and was most recently treated for 10 days which she completed on 5/16. The nurse practitioner at the [facility] wanted him to be established here and that's why he was sent in. The patient reports he is only having 2 semi-formed bowel movements a day at this point in time. He does not report any abdominal pain or cramping. He does not report any subjective fever or chills. He does report swelling of the lower extremities bilaterally. I have personally reviewed the past medical history,past surgical history, medications, social history, and family history, and I have updated the database accordingly.   PastMedical History:     Past Medical History:   Diagnosis Date    Chronic back pain     Diet-controlled type 2 diabetes mellitus [facility]) 2011 Oct     GERD (gastroesophageal reflux disease)     Hypertension     Hypertriglyceridemia     Hypothyroidism     IDDM (insulin dependent diabetes mellitus) ([facility] [address]     Osteoarthritis     Psoriasis     Type II or unspecified type diabetes mellitus without mention of complication, not stated as uncontrolled     Unspecified sleep apnea      Past Surgical  History:     Past Surgical History:   Procedure Laterality Date    PALATE SURGERY  2012     Medications:     Current Outpatient Medications   Medication Sig Dispense Refill    ciprofloxacin (CIPRO) 500 MG tablet Take 1 tablet by Tobacco Use    Smoking status: Never Smoker    Smokeless tobacco: Never Used   Substance and Sexual Activity    Alcohol use: No    Drug use: Yes     Types: Marijuana    Sexual activity: Yes     Partners: Female   Lifestyle    Physical activity:     Days per week: Not on file     Minutes per session: Not on file    Stress: Not on file   Relationships    Social connections:     Talks on phone: Not on file     Gets together: Not on file     Attends [employer] service: Not on file     Active member of club or organization: Not on file     Attends meetings of clubs or organizations: Not on file     Relationship status: Not on file    Intimate partner violence:     Fear of current or ex partner: Not on file     Emotionally abused: Not on file     Physically abused: Not on file     Forced sexual activity: Not on file   Other Topics Concern    Not on file   Social History Narrative    Not on file     Family History:     Family History   Problem Relation Age of Onset    Diabetes Mother     Hypertension Mother     Diabetes Father     Diabetes Brother     Hypertension Brother       Allergies:   Pcn [penicillins]     Review of Systems:   Review of Systems   Constitutional: Negative for chills and fever. Respiratory: Negative for cough and shortness of breath. Cardiovascular: Negative for chest pain. Gastrointestinal: Positive for diarrhea. Negative for nausea and vomiting. Genitourinary: Negative. Musculoskeletal: Negative. Neurological: Negative. Physical Examination :   BP 97/69 (Site: Left Upper Arm)   Pulse 104   Temp 97.7 °F (36.5 °C)   Ht 5' 8\" (1.727 m)   Wt 180 lb (81.6 kg)   BMI 27.37 kg/m²     Physical Exam   Constitutional: He is oriented to person, place, and time. HENT:   Head: Normocephalic and atraumatic. Pulmonary/Chest: Effort normal and breath sounds normal.   Abdominal: Soft.  Bowel sounds are normal.   Musculoskeletal: He exhibits edema (LLE edema greater than right). Neurological: He is alert and oriented to person, place, and time. Skin:   Right lower leg wound, heel wound healed           Medical Decision Making:   I haveindependently reviewed the following labs:  CBC with Differential:  Lab Results   Component Value Date    WBC 9.8 05/07/2019    WBC 8.5 04/23/2019    HGB 9.9 05/17/2019    HGB 11.1 05/07/2019    HCT 31.3 05/17/2019    HCT 33.9 05/07/2019     05/07/2019     04/23/2019     12/31/2011     12/29/2011    LYMPHOPCT 12 02/04/2019    LYMPHOPCT 24 01/30/2019    MONOPCT 9 02/04/2019    MONOPCT 12 01/30/2019     BMP:  Lab Results   Component Value Date     05/07/2019     04/23/2019    K 4.5 05/07/2019    K 4.7 04/23/2019    CL 99 05/07/2019    CL 93 04/23/2019    CO2 22 05/07/2019    CO2 25 04/23/2019    BUN 23 05/07/2019    BUN 13 04/23/2019    CREATININE 0.83 05/07/2019    CREATININE 0.47 04/23/2019    MG 1.6 04/23/2019    MG 1.5 01/24/2019     Hepatic Function Panel:   Lab Results   Component Value Date    PROT 6.4 02/04/2019    PROT 6.6 01/23/2019    LABALBU 3.5 02/04/2019    LABALBU 3.6 01/23/2019    BILIDIR 0.17 12/16/2018    BILIDIR 0.18 11/29/2018    IBILI 0.39 12/16/2018    IBILI 0.35 11/29/2018    BILITOT 0.38 02/04/2019    BILITOT 0.49 01/23/2019    ALKPHOS 151 02/04/2019    ALKPHOS 126 01/23/2019    ALT 67 02/04/2019    ALT 13 01/23/2019    AST 18 02/04/2019    AST 12 01/23/2019     Lab Results   Component Value Date    CRP 1.8 01/23/2019     No results found for: [facility]      Thank you for allowing us to participate in the care of this patient. Pleasecall with questions.     Chad Damon MD  Perfect Serve messaging: [phone]    This note is created with the assistance of a speech recognition program.  While intending to generate a document that actually reflects the content ofthe visit, the document can still have some errors including those of syntax and sound a like substitutions which may escape proof reading. It such instances, actual meaning can be extrapolated by contextual diversion.

## 2019-05-20 NOTE — PATIENT INSTRUCTIONS
5/20/19-Spoke to Ray at the [address] , he is going to fax the + c-diff results to us. He also states that patient was on therapy from 5/16-5/16, is stooling normal now but their NP wanted patient to be followed up on due the re-occurrence's. Also noting that if patient needs a follow up, he is to see Dr Tavo Newton who saw him the hospital in February for c-diff. Ray confirmed the conversation.  Francisca Hickman

## 2019-05-20 NOTE — TELEPHONE ENCOUNTER
wasn't sure why the patient was here, I called and spoke to Olivia BROWN at the [address], he is going to fax the + c-diff results to us. He also states that patient was on therapy from 5/16-5/16, is stooling normal now but their NP wanted patient to be followed up on due the re-occurrence's. Also noting that if patient needs a follow up, he is to see Dr Radha Dupree who saw him the hospital in February for c-diff. Ray confirmed the conversation.  Kathryn Burciaga

## 2019-05-23 NOTE — CARE COORDINATION
He has had several admissions since December 2018 for right foot ulcer, C diff infections, last 2/22/19 at [facility]. He went to [place] [id number] then was transferred to [facility] and Rehab 4/1/19. I called and spoke with Kiet Andrade, . There is no discharge date or plans right now. He recently saw Dr. Papo Thapa regarding C diff and food wound. I will call back in a few weeks to check on status, discharge plan. Last appt at PCP office 9/20/17.

## 2019-07-02 NOTE — PROGRESS NOTES
[place] MISC 1 box by Does not apply route as needed (glucose monitoring) 1 each 0    glucose blood VI test strips (ASCENSIA AUTODISC VI;ONE TOUCH ULTRA TEST VI) strip Use with associated glucose meter. 100 strip 11     No current facility-administered medications on file prior to visit. Allergies  Allergies   Allergen Reactions    Pcn [Penicillins] Rash        Social   Social History     Tobacco Use    Smoking status: Never Smoker    Smokeless tobacco: Never Used   Substance Use Topics    Alcohol use: No           Family History   Problem Relation Age of Onset    Diabetes Mother     Hypertension Mother     Diabetes Father     Diabetes Brother     Hypertension Brother           Review of Systems   Other than above 14systems reviewed were negative . Physical Exam  /77   Pulse 98   Temp 97.5 °F (36.4 °C)   Ht 5' 7.99\" (1.727 m)   Wt 179 lb 14.3 oz (81.6 kg)   BMI 27.36 kg/m²           General Appearance: alert and oriented to person, place and time, well-developed and well-nourished, in no acute distress  Skin: warm and dry, no rash or erythema  Head: normocephalic and atraumatic  Eyes: pupils equal, round  ENT: hearing grossly normal bilaterally. Neck: neck supple and non tender . Pulmonary/Chest: clear to auscultation bilaterally- no wheezes, rales or rhonchi, normal air movement, no respiratory distress  Cardiovascular: normal rate, regular rhythm, normal S1 and S2, no murmurs.   Abdomen: soft, non-tender, non-distended, normal bowel sounds, no masses or organomegaly  Extremities: no cyanosis, clubbing   Bilateral lower extremity edema      Data Review:    WBC   Date Value Ref Range Status   05/28/2019 11.1 3.5 - 11.3 k/uL Final   05/07/2019 9.8 3.5 - 11.3 k/uL Final   04/23/2019 8.5 3.5 - 11.3 k/uL Final     Hemoglobin   Date Value Ref Range Status   05/31/2019 10.0 (L) 13.0 - 17.0 g/dL Final   05/28/2019 9.4 (L) 13.0 - 17.0 g/dL Final   05/17/2019 9.9 (L) 13.0 -

## 2019-07-10 NOTE — CARE COORDINATION
Attempted to call both phone numbers for [facility] and Rehab to get an update about patient and discharge plans, unable to leave any messages. Will attempt to call again next week. He did have an appt with  ID last week, indicated he was still at the facility. Currently being treated with oral Vanco for C diff.     Future Appointments   Date Time Provider Manuel Mota   8/6/2019  9:30 AM Mehid Calderon MD INF [facility]

## 2019-08-21 ENCOUNTER — CARE COORDINATION (OUTPATIENT)
Dept: CARE COORDINATION | Age: 54
End: 2019-08-21

## 2019-08-21 NOTE — CARE COORDINATION
Patient noted as  by Jh Barrios on 19 when a patient at [facility] for sepsis, esophageal varices, acute resp failure.